# Patient Record
Sex: FEMALE | Race: WHITE | NOT HISPANIC OR LATINO | Employment: OTHER | URBAN - METROPOLITAN AREA
[De-identification: names, ages, dates, MRNs, and addresses within clinical notes are randomized per-mention and may not be internally consistent; named-entity substitution may affect disease eponyms.]

---

## 2019-08-02 ENCOUNTER — HOSPITAL ENCOUNTER (INPATIENT)
Facility: HOSPITAL | Age: 69
LOS: 3 days | Discharge: HOME | End: 2019-08-06
Attending: INTERNAL MEDICINE | Admitting: INTERNAL MEDICINE

## 2019-08-02 NOTE — NURSING NOTE
"  ACC REVIEW REPORT: [facility]        PATIENT NAME: Sandrine Verma    PATIENT ID: [id number]    BED: [id number]    BED TYPE: TELE    BED GIVEN TO: FABIOLA URIARTE RN     TIME BED GIVEN: 1815    YOB: 1950    AGE: 69 YRS    GENDER: FEMALE    PREVIOUS ADMIT TO [facility]:     PREVIOUS ADMISSION DATE:     PATIENT CLASS:     TODAY'S DATE: 8/2/2019    TRANSFER DATE: 08/02/2019    ETA:     TRANSFERRING FACILITY: [facility]    TRANSFERRING FACILITY PHONE # : [phone]    TRANSFERRING MD: DR WILLIS    DATE/TIME REQUEST RECEIVED:  08/02/2019    [facility] RN: WOLFGANG BECKHAM    REPORT FROM: FABIOLA URIARTE RN    TIME REPORT TAKEN: 1815    DIAGNOSIS: SYNCOPAL EPISODE, HYPOXIA, BRADYCARDIA    REASON FOR TRANSFER TO [facility]: HIGHER LEVEL CARE    TRANSPORTATION: GROUND    CLINICAL REASON FOR TRANSFER TO [facility]: FAMILY SAID THAT PATIENT WAS UNRESPONSIVE THIS MORNING. ON ARRIVAL TO THE ER, PATIENT WAS 90% ON RMA THEN IT DROPPED TO 84%.  HER PC02 WAS 60 AND HER P02 WAS 44, 02SAT-73%.  SHE WAS VOMITING \"ON AND OFF\" AND RECEIVED ZOFRAN 4 MG X 2 DOSES.  THEY PUT HER ON THE BIPAP.  AT THAT POINT, HER PC02 WAS 46, P02-67 AND 02 WAS 92%.  AT 1700, CQ56-PHA 57, P02-79.  CT SHOWED NO PE, CARDIAC ENZYMES WERE NEGATIVE.  HER HEART RATE DROPPED TO 39 AT 1430 BUT CURRENTLY IT'S 58.    HISTORY OF A CABG, DIABETES, STENTS, HTN AND KIDNEY STONES        CLINICAL INFORMATION    HEIGHT:     WEIGHT:     ALLERGIES: LEVAQUIN     MOBLEY: NO, URINE WAS NEGATIVE.     INFECTIOUS DISEASE:     ISOLATION:     1ST VITAL SIGNS:   TIME:   TEMP:   PULSE:   B/P:   RESP:     LAST VITAL SIGNS:  TIME: 1800  TEMP: afebrile  PULSE: 58  B/P: 120/64  RESP: 20  98% ON THE BIPAP      BIPAP SETTINGS ARE: 14/6, RATE-16, FI02-25%    LAB INFORMATION: WBC-9.6,     CULTURE INFORMATION:     MEDS/IV FLUIDS: # 20 G LAC                                  # 20 RIGHT HAND     RECEIVED: TYLENOL 650 MG X 1 DOSE                         ZOFRAN 4 MG X 2 DOSES    CARDIAC SYSTEM:    CHEST PAIN: "     RATE: 58    SCALE:     RHYTHM: NSR-SB      Is patient taking or has patient been given any drugs that could increase bleeding?YES  (Plavix, Brilinta, Effient, Eliquis, Xarelto, Warfarin, Integrilin, Angiomax)    DRUG: ASA 81      DOSE/FREQUENCY:     CARDIAC ENZYMES:    DATE: 08/02/2019  TIME:   CK:   CKMB:   ALBAN:   TROP: 0.02    DATE: 08/02/2019  TIME:   CK:   CKMB:   ALBAN:   TROP: 0.02      CARDIAC NOTES: LOWEST RATE WAS 39, BUT NOW HIS RATE IS 58.        RESPIRATORY SYSTEM:    LUNG SOUNDS:    CLEAR: YES  CRACKLES:   WHEEZES:   RHONCHI:   DIMINISHED:     ABG DATE: 08/02/2019        ABG TIME:     ABG RESULTS:    PH: 7.28        PH-7.37  PO2: 44        P02-67  PCO2: 62     PC02-46  HCO3:   O2 SAT:   RMA      OXYGEN:     O2 SAT: 98% ON THE BIPAP    ADMINISTRATION ROUTE:     IMAGING FINDINGS: CT SHOWED NO PE    PNEUMO LOCATION:     PNEUMO SIZE:     PNEUMO CHEST TUBE SEAL TYPE:     RADIOLOGY RESULTS:     RESPIRATORY STATUS:       CNS/MUSCULOSKELETAL    ALERT AND ORIENTED:    PERSON:   YES  PLACE: YES  TIME: yes    MRI RESULTS:     CNS/MUSCULOSKELETAL NOTES:       GI//GY      ABDOMINAL PAIN:     VOMITING: HAS RECEIVED ZOFRAN X 2 DOSES    DIARRHEA:     NAUSEA: YES    BOWEL SOUNDS:     OCCULT STOOL:      VAGINAL BLEEDING:     TESTICULAR PAIN:     HEMATURIA:     NG TUBE:    SIZE:   DATE INSERTED:       ULTRASOUND:     ULTRASOUND RESULTS:       ACUTE ABDOMEN:     ACUTE ABDOMEN RESULTS:       CT SCAN:     CT SCAN RESULTS:       GI//GY NOTES:     PAST MEDICAL HISTORY: CABG & CARDIAC STENTS    OTHER SYMPTOM NOTES:     ADDITIONAL NOTES:           Phoebe Chinchilla RN  8/2/2019  6:05 PM

## 2019-08-03 ENCOUNTER — APPOINTMENT (OUTPATIENT)
Dept: CARDIOLOGY | Facility: HOSPITAL | Age: 69
End: 2019-08-03

## 2019-08-03 ENCOUNTER — APPOINTMENT (OUTPATIENT)
Dept: GENERAL RADIOLOGY | Facility: HOSPITAL | Age: 69
End: 2019-08-03

## 2019-08-03 ENCOUNTER — APPOINTMENT (OUTPATIENT)
Dept: NUCLEAR MEDICINE | Facility: HOSPITAL | Age: 69
End: 2019-08-03

## 2019-08-03 PROBLEM — I45.2 BIFASCICULAR BUNDLE BRANCH BLOCK: Status: ACTIVE | Noted: 2019-08-03

## 2019-08-03 PROBLEM — I25.119 CORONARY ARTERY DISEASE INVOLVING NATIVE CORONARY ARTERY OF NATIVE HEART WITH ANGINA PECTORIS (HCC): Status: ACTIVE | Noted: 2019-08-03

## 2019-08-03 PROBLEM — E78.5 HLD (HYPERLIPIDEMIA): Status: ACTIVE | Noted: 2019-08-03

## 2019-08-03 PROBLEM — R00.1 BRADYCARDIA: Status: ACTIVE | Noted: 2019-08-03

## 2019-08-03 PROBLEM — I10 HTN (HYPERTENSION): Status: ACTIVE | Noted: 2019-08-03

## 2019-08-03 PROBLEM — N28.9 RENAL INSUFFICIENCY: Status: ACTIVE | Noted: 2019-08-03

## 2019-08-03 PROBLEM — E11.9 TYPE 2 DIABETES MELLITUS: Status: ACTIVE | Noted: 2019-08-03

## 2019-08-03 PROBLEM — E03.9 HYPOTHYROIDISM: Status: ACTIVE | Noted: 2019-08-03

## 2019-08-03 PROBLEM — E11.29 TYPE 2 DIABETES MELLITUS WITH KIDNEY COMPLICATION, WITH LONG-TERM CURRENT USE OF INSULIN (HCC): Status: ACTIVE | Noted: 2019-08-03

## 2019-08-03 PROBLEM — I25.10 CAD (CORONARY ARTERY DISEASE): Status: ACTIVE | Noted: 2019-08-03

## 2019-08-03 PROBLEM — R55 SYNCOPAL EPISODES: Status: ACTIVE | Noted: 2019-08-03

## 2019-08-03 PROBLEM — R82.71 BACTERIURIA: Status: ACTIVE | Noted: 2019-08-03

## 2019-08-03 PROBLEM — E87.5 HYPERKALEMIA: Status: ACTIVE | Noted: 2019-08-03

## 2019-08-03 PROBLEM — J96.02 ACUTE RESPIRATORY FAILURE WITH HYPOXIA AND HYPERCAPNIA: Status: ACTIVE | Noted: 2019-08-03

## 2019-08-03 PROBLEM — J96.01 ACUTE RESPIRATORY FAILURE WITH HYPOXIA AND HYPERCAPNIA (HCC): Status: ACTIVE | Noted: 2019-08-03

## 2019-08-03 PROBLEM — N17.9 AKI (ACUTE KIDNEY INJURY) (HCC): Status: ACTIVE | Noted: 2019-08-03

## 2019-08-03 PROBLEM — Z79.4 TYPE 2 DIABETES MELLITUS WITH KIDNEY COMPLICATION, WITH LONG-TERM CURRENT USE OF INSULIN (HCC): Status: ACTIVE | Noted: 2019-08-03

## 2019-08-03 PROBLEM — R00.1 BRADYCARDIA: Status: RESOLVED | Noted: 2019-08-03 | Resolved: 2019-08-03

## 2019-08-03 PROBLEM — E87.1 HYPONATREMIA: Status: ACTIVE | Noted: 2019-08-03

## 2019-08-03 LAB
ALBUMIN SERPL-MCNC: 3.8 G/DL (ref 3.5–5.2)
ALBUMIN/GLOB SERPL: 1.1 G/DL
ALP SERPL-CCNC: 52 U/L (ref 39–117)
ALT SERPL W P-5'-P-CCNC: 13 U/L (ref 1–33)
ANION GAP SERPL CALCULATED.3IONS-SCNC: 13 MMOL/L (ref 5–15)
ANION GAP SERPL CALCULATED.3IONS-SCNC: 14 MMOL/L (ref 5–15)
ARTERIAL PATENCY WRIST A: ABNORMAL
ARTERIAL PATENCY WRIST A: ABNORMAL
AST SERPL-CCNC: 17 U/L (ref 1–32)
ATMOSPHERIC PRESS: ABNORMAL MMHG
BACTERIA UR QL AUTO: ABNORMAL /HPF
BASE EXCESS BLDA CALC-SCNC: -1.3 MMOL/L (ref 0–2)
BASE EXCESS BLDA CALC-SCNC: -2.4 MMOL/L (ref 0–2)
BASE EXCESS BLDV CALC-SCNC: -0.8 MMOL/L (ref -2–2)
BASOPHILS # BLD AUTO: 0.08 10*3/MM3 (ref 0–0.2)
BASOPHILS NFR BLD AUTO: 0.7 % (ref 0–1.5)
BDY SITE: ABNORMAL
BILIRUB SERPL-MCNC: 0.4 MG/DL (ref 0.2–1.2)
BILIRUB UR QL STRIP: NEGATIVE
BODY TEMPERATURE: 37 C
BUN BLD-MCNC: 37 MG/DL (ref 8–23)
BUN BLD-MCNC: 38 MG/DL (ref 8–23)
BUN/CREAT SERPL: 21 (ref 7–25)
BUN/CREAT SERPL: 23.5 (ref 7–25)
CALCIUM SPEC-SCNC: 9 MG/DL (ref 8.6–10.5)
CALCIUM SPEC-SCNC: 9.5 MG/DL (ref 8.6–10.5)
CHLORIDE SERPL-SCNC: 100 MMOL/L (ref 98–107)
CHLORIDE SERPL-SCNC: 99 MMOL/L (ref 98–107)
CHOLEST SERPL-MCNC: 151 MG/DL (ref 0–200)
CLARITY UR: ABNORMAL
CO2 BLDA-SCNC: 26.9 MMOL/L (ref 23–27)
CO2 BLDA-SCNC: 27.4 MMOL/L (ref 23–27)
CO2 BLDA-SCNC: 27.5 MMOL/L (ref 23–27)
CO2 SERPL-SCNC: 20 MMOL/L (ref 22–29)
CO2 SERPL-SCNC: 24 MMOL/L (ref 22–29)
COHGB MFR BLD: 0.7 % (ref 0–2)
COHGB MFR BLD: 1 % (ref 0–2)
COHGB MFR BLD: 1.1 %
COLOR UR: YELLOW
CREAT BLD-MCNC: 1.62 MG/DL (ref 0.57–1)
CREAT BLD-MCNC: 1.76 MG/DL (ref 0.57–1)
CREAT UR-MCNC: 104.8 MG/DL
D DIMER PPP FEU-MCNC: 0.75 MCGFEU/ML (ref 0–0.56)
D-LACTATE SERPL-SCNC: 1.2 MMOL/L (ref 0.5–2)
DEPRECATED RDW RBC AUTO: 45.2 FL (ref 37–54)
EOSINOPHIL # BLD AUTO: 0.17 10*3/MM3 (ref 0–0.4)
EOSINOPHIL NFR BLD AUTO: 1.6 % (ref 0.3–6.2)
EPAP: 0
EPAP: 0
EPAP: 6
ERYTHROCYTE [DISTWIDTH] IN BLOOD BY AUTOMATED COUNT: 13.1 % (ref 12.3–15.4)
GFR SERPL CREATININE-BSD FRML MDRD: 29 ML/MIN/1.73
GFR SERPL CREATININE-BSD FRML MDRD: 31 ML/MIN/1.73
GLOBULIN UR ELPH-MCNC: 3.4 GM/DL
GLUCOSE BLD-MCNC: 236 MG/DL (ref 65–99)
GLUCOSE BLD-MCNC: 275 MG/DL (ref 65–99)
GLUCOSE BLDC GLUCOMTR-MCNC: 244 MG/DL (ref 70–130)
GLUCOSE BLDC GLUCOMTR-MCNC: 293 MG/DL (ref 70–130)
GLUCOSE BLDC GLUCOMTR-MCNC: 297 MG/DL (ref 70–130)
GLUCOSE BLDC GLUCOMTR-MCNC: 301 MG/DL (ref 70–130)
GLUCOSE UR STRIP-MCNC: ABNORMAL MG/DL
HBA1C MFR BLD: 8.8 % (ref 4.8–5.6)
HCO3 BLDA-SCNC: 25.2 MMOL/L (ref 20–26)
HCO3 BLDA-SCNC: 25.9 MMOL/L (ref 20–26)
HCO3 BLDV-SCNC: 25.9 MMOL/L (ref 22–28)
HCT VFR BLD AUTO: 43.6 % (ref 34–46.6)
HCT VFR BLD CALC: 41.1 %
HCT VFR BLD CALC: 42.2 %
HDLC SERPL-MCNC: 44 MG/DL (ref 40–60)
HGB BLD-MCNC: 13.5 G/DL (ref 12–15.9)
HGB BLDA-MCNC: 13.4 G/DL (ref 14–18)
HGB BLDA-MCNC: 13.5 G/DL (ref 14–18)
HGB BLDA-MCNC: 13.8 G/DL (ref 14–18)
HGB UR QL STRIP.AUTO: ABNORMAL
HOROWITZ INDEX BLD+IHG-RTO: 30 %
HOROWITZ INDEX BLD+IHG-RTO: 41 %
HOROWITZ INDEX BLD+IHG-RTO: 45 %
HYALINE CASTS UR QL AUTO: ABNORMAL /LPF
IMM GRANULOCYTES # BLD AUTO: 0.03 10*3/MM3 (ref 0–0.05)
IMM GRANULOCYTES NFR BLD AUTO: 0.3 % (ref 0–0.5)
IPAP: 0
IPAP: 0
IPAP: 14
KETONES UR QL STRIP: NEGATIVE
LDLC SERPL CALC-MCNC: 57 MG/DL (ref 0–100)
LDLC/HDLC SERPL: 1.3 {RATIO}
LEUKOCYTE ESTERASE UR QL STRIP.AUTO: ABNORMAL
LYMPHOCYTES # BLD AUTO: 1.93 10*3/MM3 (ref 0.7–3.1)
LYMPHOCYTES NFR BLD AUTO: 18.1 % (ref 19.6–45.3)
MCH RBC QN AUTO: 29.5 PG (ref 26.6–33)
MCHC RBC AUTO-ENTMCNC: 31 G/DL (ref 31.5–35.7)
MCV RBC AUTO: 95.2 FL (ref 79–97)
METHGB BLD QL: 0.8 %
METHGB BLD QL: 0.9 % (ref 0–1.5)
METHGB BLD QL: 1.1 % (ref 0–1.5)
MODALITY: ABNORMAL
MONOCYTES # BLD AUTO: 0.77 10*3/MM3 (ref 0.1–0.9)
MONOCYTES NFR BLD AUTO: 7.2 % (ref 5–12)
NEUTROPHILS # BLD AUTO: 7.71 10*3/MM3 (ref 1.7–7)
NEUTROPHILS NFR BLD AUTO: 72.1 % (ref 42.7–76)
NITRITE UR QL STRIP: NEGATIVE
NOTE: ABNORMAL
NRBC BLD AUTO-RTO: 0 /100 WBC (ref 0–0.2)
NT-PROBNP SERPL-MCNC: 512.4 PG/ML (ref 5–900)
OXYHGB MFR BLDV: 92.1 % (ref 94–99)
OXYHGB MFR BLDV: 92.8 %
OXYHGB MFR BLDV: 96.4 % (ref 94–99)
PAW @ PEAK INSP FLOW SETTING VENT: 0 CMH2O
PCO2 BLDA: 52.7 MM HG (ref 35–45)
PCO2 BLDA: 54.1 MM HG (ref 35–45)
PCO2 BLDV: 49.5 MM HG (ref 41–51)
PCO2 TEMP ADJ BLD: 52.7 MM HG (ref 35–45)
PCO2 TEMP ADJ BLD: 54.1 MM HG (ref 35–45)
PH BLDA: 7.28 PH UNITS (ref 7.35–7.45)
PH BLDA: 7.3 PH UNITS (ref 7.35–7.45)
PH BLDV: 7.33 PH UNITS
PH UR STRIP.AUTO: <=5 [PH] (ref 5–8)
PH, TEMP CORRECTED: 7.28 PH UNITS
PH, TEMP CORRECTED: 7.3 PH UNITS
PLATELET # BLD AUTO: 241 10*3/MM3 (ref 140–450)
PMV BLD AUTO: 9.2 FL (ref 6–12)
PO2 BLDA: 117 MM HG (ref 83–108)
PO2 BLDA: 71.4 MM HG (ref 83–108)
PO2 BLDV: 71 MM HG (ref 27–53)
PO2 TEMP ADJ BLD: 117 MM HG (ref 83–108)
PO2 TEMP ADJ BLD: 71.4 MM HG (ref 83–108)
POTASSIUM BLD-SCNC: 5.2 MMOL/L (ref 3.5–5.2)
POTASSIUM BLD-SCNC: 5.4 MMOL/L (ref 3.5–5.2)
PROT SERPL-MCNC: 7.2 G/DL (ref 6–8.5)
PROT UR QL STRIP: ABNORMAL
RBC # BLD AUTO: 4.58 10*6/MM3 (ref 3.77–5.28)
RBC # UR: ABNORMAL /HPF
REF LAB TEST METHOD: ABNORMAL
SET MECH RESP RATE: 14
SODIUM BLD-SCNC: 133 MMOL/L (ref 136–145)
SODIUM BLD-SCNC: 137 MMOL/L (ref 136–145)
SODIUM UR-SCNC: <20 MMOL/L
SP GR UR STRIP: 1.04 (ref 1–1.03)
SQUAMOUS #/AREA URNS HPF: ABNORMAL /HPF
TOTAL RATE: 0 BREATHS/MINUTE
TRIGL SERPL-MCNC: 250 MG/DL (ref 0–150)
TROPONIN T SERPL-MCNC: <0.01 NG/ML (ref 0–0.03)
TSH SERPL DL<=0.05 MIU/L-ACNC: 2.44 MIU/ML (ref 0.27–4.2)
UROBILINOGEN UR QL STRIP: ABNORMAL
VENTILATOR MODE: ABNORMAL
VLDLC SERPL-MCNC: 50 MG/DL
WBC NRBC COR # BLD: 10.69 10*3/MM3 (ref 3.4–10.8)
WBC UR QL AUTO: ABNORMAL /HPF

## 2019-08-03 PROCEDURE — 80053 COMPREHEN METABOLIC PANEL: CPT | Performed by: PHYSICIAN ASSISTANT

## 2019-08-03 PROCEDURE — 99223 1ST HOSP IP/OBS HIGH 75: CPT | Performed by: FAMILY MEDICINE

## 2019-08-03 PROCEDURE — 94660 CPAP INITIATION&MGMT: CPT

## 2019-08-03 PROCEDURE — 87086 URINE CULTURE/COLONY COUNT: CPT | Performed by: PHYSICIAN ASSISTANT

## 2019-08-03 PROCEDURE — 82805 BLOOD GASES W/O2 SATURATION: CPT

## 2019-08-03 PROCEDURE — 93880 EXTRACRANIAL BILAT STUDY: CPT | Performed by: INTERNAL MEDICINE

## 2019-08-03 PROCEDURE — 36600 WITHDRAWAL OF ARTERIAL BLOOD: CPT

## 2019-08-03 PROCEDURE — 99222 1ST HOSP IP/OBS MODERATE 55: CPT | Performed by: INTERNAL MEDICINE

## 2019-08-03 PROCEDURE — 25010000002 ENOXAPARIN PER 10 MG: Performed by: INTERNAL MEDICINE

## 2019-08-03 PROCEDURE — 63710000001 FLUTICASONE 50 MCG/ACT SUSPENSION 16 G BOTTLE: Performed by: PHYSICIAN ASSISTANT

## 2019-08-03 PROCEDURE — 82570 ASSAY OF URINE CREATININE: CPT | Performed by: INTERNAL MEDICINE

## 2019-08-03 PROCEDURE — 25010000002 CEFTRIAXONE PER 250 MG: Performed by: INTERNAL MEDICINE

## 2019-08-03 PROCEDURE — 93880 EXTRACRANIAL BILAT STUDY: CPT

## 2019-08-03 PROCEDURE — 63710000001 ASPIRIN 325 MG TABLET: Performed by: PHYSICIAN ASSISTANT

## 2019-08-03 PROCEDURE — 93010 ELECTROCARDIOGRAM REPORT: CPT | Performed by: INTERNAL MEDICINE

## 2019-08-03 PROCEDURE — A9270 NON-COVERED ITEM OR SERVICE: HCPCS | Performed by: PHYSICIAN ASSISTANT

## 2019-08-03 PROCEDURE — 85025 COMPLETE CBC W/AUTO DIFF WBC: CPT | Performed by: PHYSICIAN ASSISTANT

## 2019-08-03 PROCEDURE — A9558 XE133 XENON 10MCI: HCPCS | Performed by: INTERNAL MEDICINE

## 2019-08-03 PROCEDURE — 63710000001 INSULIN LISPRO (HUMAN) PER 5 UNITS: Performed by: PHYSICIAN ASSISTANT

## 2019-08-03 PROCEDURE — 25010000002 HEPARIN (PORCINE) PER 1000 UNITS: Performed by: PHYSICIAN ASSISTANT

## 2019-08-03 PROCEDURE — 82962 GLUCOSE BLOOD TEST: CPT

## 2019-08-03 PROCEDURE — 83880 ASSAY OF NATRIURETIC PEPTIDE: CPT | Performed by: PHYSICIAN ASSISTANT

## 2019-08-03 PROCEDURE — 82820 HEMOGLOBIN-OXYGEN AFFINITY: CPT

## 2019-08-03 PROCEDURE — 83605 ASSAY OF LACTIC ACID: CPT | Performed by: PHYSICIAN ASSISTANT

## 2019-08-03 PROCEDURE — 63710000001 LEVOTHYROXINE 100 MCG TABLET: Performed by: PHYSICIAN ASSISTANT

## 2019-08-03 PROCEDURE — 0 TECHNETIUM ALBUMIN AGGREGATED: Performed by: INTERNAL MEDICINE

## 2019-08-03 PROCEDURE — A9540 TC99M MAA: HCPCS | Performed by: INTERNAL MEDICINE

## 2019-08-03 PROCEDURE — 84300 ASSAY OF URINE SODIUM: CPT | Performed by: INTERNAL MEDICINE

## 2019-08-03 PROCEDURE — 81001 URINALYSIS AUTO W/SCOPE: CPT | Performed by: PHYSICIAN ASSISTANT

## 2019-08-03 PROCEDURE — 94799 UNLISTED PULMONARY SVC/PX: CPT

## 2019-08-03 PROCEDURE — 93005 ELECTROCARDIOGRAM TRACING: CPT | Performed by: PHYSICIAN ASSISTANT

## 2019-08-03 PROCEDURE — 71045 X-RAY EXAM CHEST 1 VIEW: CPT

## 2019-08-03 PROCEDURE — 85379 FIBRIN DEGRADATION QUANT: CPT | Performed by: PHYSICIAN ASSISTANT

## 2019-08-03 PROCEDURE — 80061 LIPID PANEL: CPT | Performed by: PHYSICIAN ASSISTANT

## 2019-08-03 PROCEDURE — 83036 HEMOGLOBIN GLYCOSYLATED A1C: CPT | Performed by: PHYSICIAN ASSISTANT

## 2019-08-03 PROCEDURE — 0 XENON XE 133: Performed by: INTERNAL MEDICINE

## 2019-08-03 PROCEDURE — 84443 ASSAY THYROID STIM HORMONE: CPT | Performed by: PHYSICIAN ASSISTANT

## 2019-08-03 PROCEDURE — 78582 LUNG VENTILAT&PERFUS IMAGING: CPT

## 2019-08-03 PROCEDURE — 93306 TTE W/DOPPLER COMPLETE: CPT

## 2019-08-03 PROCEDURE — 93306 TTE W/DOPPLER COMPLETE: CPT | Performed by: INTERNAL MEDICINE

## 2019-08-03 PROCEDURE — 84484 ASSAY OF TROPONIN QUANT: CPT | Performed by: PHYSICIAN ASSISTANT

## 2019-08-03 PROCEDURE — 63710000001 CITALOPRAM 20 MG TABLET: Performed by: PHYSICIAN ASSISTANT

## 2019-08-03 PROCEDURE — 99291 CRITICAL CARE FIRST HOUR: CPT | Performed by: INTERNAL MEDICINE

## 2019-08-03 RX ORDER — SODIUM CHLORIDE 9 MG/ML
100 INJECTION, SOLUTION INTRAVENOUS CONTINUOUS
Status: DISCONTINUED | OUTPATIENT
Start: 2019-08-03 | End: 2019-08-04

## 2019-08-03 RX ORDER — LEVOTHYROXINE SODIUM 0.1 MG/1
100 TABLET ORAL EVERY MORNING
COMMUNITY

## 2019-08-03 RX ORDER — ASPIRIN 325 MG
325 TABLET ORAL DAILY
Status: DISCONTINUED | OUTPATIENT
Start: 2019-08-03 | End: 2019-08-06 | Stop reason: HOSPADM

## 2019-08-03 RX ORDER — QUINAPRIL 5 1/1
2.5 TABLET ORAL DAILY
COMMUNITY
End: 2019-08-06 | Stop reason: HOSPADM

## 2019-08-03 RX ORDER — DEXTROSE MONOHYDRATE 25 G/50ML
25 INJECTION, SOLUTION INTRAVENOUS
Status: DISCONTINUED | OUTPATIENT
Start: 2019-08-03 | End: 2019-08-06 | Stop reason: HOSPADM

## 2019-08-03 RX ORDER — FLUTICASONE PROPIONATE 50 MCG
2 SPRAY, SUSPENSION (ML) NASAL DAILY
COMMUNITY

## 2019-08-03 RX ORDER — SODIUM CHLORIDE 0.9 % (FLUSH) 0.9 %
3-10 SYRINGE (ML) INJECTION AS NEEDED
Status: DISCONTINUED | OUTPATIENT
Start: 2019-08-03 | End: 2019-08-06 | Stop reason: HOSPADM

## 2019-08-03 RX ORDER — ATORVASTATIN CALCIUM 20 MG/1
20 TABLET, FILM COATED ORAL NIGHTLY
Status: DISCONTINUED | OUTPATIENT
Start: 2019-08-03 | End: 2019-08-06 | Stop reason: HOSPADM

## 2019-08-03 RX ORDER — CHOLESTYRAMINE LIGHT 4 G/5.7G
1 POWDER, FOR SUSPENSION ORAL 2 TIMES DAILY WITH MEALS
Status: DISCONTINUED | OUTPATIENT
Start: 2019-08-03 | End: 2019-08-06 | Stop reason: HOSPADM

## 2019-08-03 RX ORDER — ONDANSETRON 2 MG/ML
4 INJECTION INTRAMUSCULAR; INTRAVENOUS EVERY 6 HOURS PRN
Status: DISCONTINUED | OUTPATIENT
Start: 2019-08-03 | End: 2019-08-06 | Stop reason: HOSPADM

## 2019-08-03 RX ORDER — LEVOTHYROXINE SODIUM 0.1 MG/1
100 TABLET ORAL
Status: DISCONTINUED | OUTPATIENT
Start: 2019-08-03 | End: 2019-08-06 | Stop reason: HOSPADM

## 2019-08-03 RX ORDER — NICOTINE POLACRILEX 4 MG
15 LOZENGE BUCCAL
Status: DISCONTINUED | OUTPATIENT
Start: 2019-08-03 | End: 2019-08-06 | Stop reason: HOSPADM

## 2019-08-03 RX ORDER — ATORVASTATIN CALCIUM 20 MG/1
20 TABLET, FILM COATED ORAL NIGHTLY
COMMUNITY

## 2019-08-03 RX ORDER — CITALOPRAM 20 MG/1
20 TABLET ORAL DAILY
Status: DISCONTINUED | OUTPATIENT
Start: 2019-08-03 | End: 2019-08-06 | Stop reason: HOSPADM

## 2019-08-03 RX ORDER — SODIUM CHLORIDE 0.9 % (FLUSH) 0.9 %
3 SYRINGE (ML) INJECTION EVERY 12 HOURS SCHEDULED
Status: DISCONTINUED | OUTPATIENT
Start: 2019-08-03 | End: 2019-08-06 | Stop reason: HOSPADM

## 2019-08-03 RX ORDER — ONDANSETRON 4 MG/1
4 TABLET, FILM COATED ORAL EVERY 6 HOURS PRN
Status: DISCONTINUED | OUTPATIENT
Start: 2019-08-03 | End: 2019-08-06 | Stop reason: HOSPADM

## 2019-08-03 RX ORDER — CITALOPRAM 20 MG/1
20 TABLET ORAL DAILY
COMMUNITY

## 2019-08-03 RX ORDER — SODIUM CHLORIDE 9 MG/ML
100 INJECTION, SOLUTION INTRAVENOUS ONCE
Status: COMPLETED | OUTPATIENT
Start: 2019-08-03 | End: 2019-08-03

## 2019-08-03 RX ORDER — FLUTICASONE PROPIONATE 50 MCG
2 SPRAY, SUSPENSION (ML) NASAL DAILY
Status: DISCONTINUED | OUTPATIENT
Start: 2019-08-03 | End: 2019-08-06 | Stop reason: HOSPADM

## 2019-08-03 RX ORDER — HEPARIN SODIUM 5000 [USP'U]/ML
5000 INJECTION, SOLUTION INTRAVENOUS; SUBCUTANEOUS EVERY 8 HOURS SCHEDULED
Status: DISCONTINUED | OUTPATIENT
Start: 2019-08-03 | End: 2019-08-03

## 2019-08-03 RX ORDER — COLESEVELAM 180 1/1
1250 TABLET ORAL 2 TIMES DAILY WITH MEALS
COMMUNITY

## 2019-08-03 RX ORDER — LISINOPRIL 5 MG/1
2.5 TABLET ORAL
Status: DISCONTINUED | OUTPATIENT
Start: 2019-08-03 | End: 2019-08-03

## 2019-08-03 RX ADMIN — CHOLESTYRAMINE 4 G: 4 POWDER, FOR SUSPENSION ORAL at 15:01

## 2019-08-03 RX ADMIN — SODIUM CHLORIDE 100 ML/HR: 9 INJECTION, SOLUTION INTRAVENOUS at 03:25

## 2019-08-03 RX ADMIN — Medication 1 DOSE: at 16:08

## 2019-08-03 RX ADMIN — CITALOPRAM HYDROBROMIDE 20 MG: 20 TABLET ORAL at 08:21

## 2019-08-03 RX ADMIN — XENON XE-133 13.77 MILLICURIE: 10 GAS RESPIRATORY (INHALATION) at 15:58

## 2019-08-03 RX ADMIN — INSULIN LISPRO 4 UNITS: 100 INJECTION, SOLUTION INTRAVENOUS; SUBCUTANEOUS at 12:03

## 2019-08-03 RX ADMIN — ENOXAPARIN SODIUM 90 MG: 100 INJECTION SUBCUTANEOUS at 20:39

## 2019-08-03 RX ADMIN — LEVOTHYROXINE SODIUM 100 MCG: 100 TABLET ORAL at 06:40

## 2019-08-03 RX ADMIN — HEPARIN SODIUM 5000 UNITS: 5000 INJECTION INTRAVENOUS; SUBCUTANEOUS at 06:40

## 2019-08-03 RX ADMIN — ASPIRIN 325 MG ORAL TABLET 325 MG: 325 PILL ORAL at 08:21

## 2019-08-03 RX ADMIN — INSULIN LISPRO 3 UNITS: 100 INJECTION, SOLUTION INTRAVENOUS; SUBCUTANEOUS at 18:06

## 2019-08-03 RX ADMIN — FLUTICASONE PROPIONATE 2 SPRAY: 50 SPRAY, METERED NASAL at 08:22

## 2019-08-03 RX ADMIN — ENOXAPARIN SODIUM 90 MG: 100 INJECTION SUBCUTANEOUS at 08:21

## 2019-08-03 RX ADMIN — INSULIN LISPRO 4 UNITS: 100 INJECTION, SOLUTION INTRAVENOUS; SUBCUTANEOUS at 08:22

## 2019-08-03 RX ADMIN — PATIROMER 1 PACKET: 8.4 POWDER, FOR SUSPENSION ORAL at 10:21

## 2019-08-03 RX ADMIN — ATORVASTATIN CALCIUM 20 MG: 20 TABLET, FILM COATED ORAL at 20:40

## 2019-08-03 RX ADMIN — CEFTRIAXONE SODIUM 1 G: 1 INJECTION, POWDER, FOR SOLUTION INTRAMUSCULAR; INTRAVENOUS at 08:19

## 2019-08-03 RX ADMIN — CHOLECALCIFEROL CAP 125 MCG (5000 UNIT) 5000 UNITS: 125 CAP at 09:14

## 2019-08-03 RX ADMIN — INSULIN LISPRO 5 UNITS: 100 INJECTION, SOLUTION INTRAVENOUS; SUBCUTANEOUS at 20:40

## 2019-08-03 RX ADMIN — SODIUM CHLORIDE 100 ML/HR: 9 INJECTION, SOLUTION INTRAVENOUS at 14:58

## 2019-08-03 RX ADMIN — SODIUM CHLORIDE, PRESERVATIVE FREE 10 ML: 5 INJECTION INTRAVENOUS at 08:24

## 2019-08-03 NOTE — CONSULTS
Inpatient Cardiology Consult  Consult performed by: Elliot Ramos IV, MD  Consult ordered by: Vanna Pedersen PA-C  Reason for consult: Syncope / MUHAMMAD / history of CAD          [facility]  Cardiovascular Consultation Note         The patient is a 69-year-old female with a history of coronary artery disease status post CABG in 2007 and PCI in 2009 at [facility], type 2 diabetes, chronic renal insufficiency.  The patient was transferred from [facility] to [facility] after experiencing a syncopal episode yesterday morning.  The patient and her  had been out walking for exercise earlier that morning.  They had returned to the house, taken coffee, and were about to take a shower.  The patient was sitting on a couch and her  noticed that she was not responding to his questions.  The patient had lost consciousness and was slumped over on the couch.  He reports that she looked ashen.  EMS was called and arrived within 10 minutes.  The patient became nauseous and vomited in the EMS truck.    At [facility] ER, initial EKG showed marked sinus bradycardia with a heart rate of 39 bpm and a bifascicular block.  She was hypoxic.  Troponins and BNP were unremarkable.  She was transferred to [facility] for cardiac evaluation.    The patient reports being sedentary but over the last couple weeks has attempted to start a walking regimen with her .  She states that she becomes winded after 15 minutes and a quarter of a mile.  She reports that when she had her stent placed after bypass surgery, she was experiencing dyspnea and chest pressure.  The patient denies any recent chest pressure but certainly endorses dyspnea.    A VQ scan has just been completed and an echocardiogram is pending.      Cardiac risk factors: Advanced age, known CAD, hypertension, hyperlipidemia, uncontrolled diabetes mellitus    Past medical and surgical  history, social and family history reviewed in EMR.    REVIEW OF SYSTEMS:   H&P ROS reviewed and pertinent CV ROS as noted in HPI.         Vital Sign Min/Max for last 24 hours  Temp  Min: 98 °F (36.7 °C)  Max: 98.4 °F (36.9 °C)   BP  Min: 124/59  Max: 140/62   Pulse  Min: 61  Max: 76   Resp  Min: 16  Max: 18   SpO2  Min: 84 %  Max: 100 %   No Data Recorded      Intake/Output Summary (Last 24 hours) at 8/3/2019 1658  Last data filed at 8/3/2019 0819  Gross per 24 hour   Intake 100 ml   Output 600 ml   Net -500 ml           Physical Exam   Constitutional: She is oriented to person, place, and time. She appears well-developed and well-nourished.   HENT:   Head: Normocephalic and atraumatic.   Eyes: Pupils are equal, round, and reactive to light. No scleral icterus.   Neck: No JVD present. Carotid bruit is not present. No thyromegaly present.   Cardiovascular: Normal rate and regular rhythm. Exam reveals no gallop.   No murmur heard.  Pulmonary/Chest: Effort normal and breath sounds normal.   Abdominal: Soft. She exhibits no distension. There is no hepatosplenomegaly.   Musculoskeletal: She exhibits no edema.   Neurological: She is alert and oriented to person, place, and time.   Skin: Skin is warm and dry.   Psychiatric: She has a normal mood and affect. Her behavior is normal.       EKG (8/3/2019):  NSR at 71.  First-degree AV block.  Right bundle branch block.  Bifascicular block     EKG performed at OSH shows marked sinus bradycardia at 39 bpm.  Bifascicular block      Lab Review:   Labs reviewed in the electronic medical record.  Pertinent findings include:  Lab Results   Component Value Date    GLUCOSE 275 (H) 08/03/2019    BUN 37 (H) 08/03/2019    CREATININE 1.76 (H) 08/03/2019    EGFRIFNONA 29 (L) 08/03/2019    BCR 21.0 08/03/2019    K 5.2 08/03/2019    CO2 20.0 (L) 08/03/2019    CALCIUM 9.0 08/03/2019    ALBUMIN 3.80 08/03/2019    AST 17 08/03/2019    ALT 13 08/03/2019     Lab Results   Component Value Date     WBC 10.69 08/03/2019    HGB 13.5 08/03/2019    HCT 43.6 08/03/2019    MCV 95.2 08/03/2019     08/03/2019     Lab Results   Component Value Date    CHOL 151 08/03/2019    TRIG 250 (H) 08/03/2019    HDL 44 08/03/2019    LDL 57 08/03/2019     Results from last 7 days   Lab Units 08/03/19  0116   HEMOGLOBIN A1C % 8.80*       CHEST X-RAY, 8/3/2019    1. No active disease.  2. Mild cardiomegaly. CABG.     Echo pending    VQ scan pending           Active Hospital Problems    Diagnosis   • **Syncope     · Nonexertional syncope, 8/2/2019  · EKG (8/2/2019): marked sinus bradycardia 39 bpm and bifascicular block (on metoprolol 12.5 mg bid)     • Acute respiratory failure with hypoxia and hypercapnia (CMS/HCC)   • Coronary artery disease involving native coronary artery of native heart with angina pectoris (CMS/HCC)     · CABG at [place] 2007  · Multiple PCI's last 2009 with Dr. Woodward  · Functional class III heart failure symptoms, 8/2019     • Type 2 diabetes mellitus, uncontrolled   • Renal insufficiency     · Unknown baseline Cr     • Bifascicular bundle branch block   • Bacteriuria   • Essential hypertension   • Hyperlipidemia LDL goal <70     · High intensity statin therapy indicated     • Hypothyroidism   • Hyperkalemia   • Hyponatremia             · No beta-blocker due to symptomatic bradycardia and bifascicular block  · Await results of V/Q and echo  · Ischemic evaluation Monday.  Will determine modality following echo results and renal function results tomorrow.  · Will review with EP.  If no other cause of syncope found, patient may require permanent pacemaker.      ADEBAYO Ramos MD [facility], [facility]  Interventional and General Cardiology

## 2019-08-03 NOTE — PLAN OF CARE
Problem: Patient Care Overview  Goal: Plan of Care Review  Outcome: Ongoing (interventions implemented as appropriate)   08/03/19 6458   Coping/Psychosocial   Plan of Care Reviewed With patient;family   Plan of Care Review   Progress improving   OTHER   Outcome Summary tolerating 5L NC, vss, SR on tele. denies pain. lg BM , urine collected and patient voiding spontaneously.

## 2019-08-03 NOTE — PLAN OF CARE
Problem: Patient Care Overview  Goal: Plan of Care Review  Outcome: Ongoing (interventions implemented as appropriate)   08/03/19 0623   Coping/Psychosocial   Plan of Care Reviewed With patient   Plan of Care Review   Progress no change   OTHER   Outcome Summary Pt arrived from [facility]. BipPAP at 45%. NSR. No reports of pain. Jenni celeste.

## 2019-08-03 NOTE — H&P
[facility] Medicine Services  HISTORY AND PHYSICAL    Patient Name: Sandrine Verma  : 1950  MRN: [id number]  Primary Care Physician: Chelsey Bhagat MD  Date of admission: 2019      Subjective   Subjective     Chief Complaint: Syncopal episode    HPI:  Sandrine Verma is a 69 y.o. female with a medical history significant for CAD, HTN, T2DM presented to OSH after syncopal episode. The pt went for a 15 min walk this morning and afterwards while eating breakfast she abruptly stopped responding to her .  The  found her slumped over and unresponsive.  The patient's daughter was there several minutes later and stated she was breathing however she could not form words, she was diaphoretic, gray and began vomiting several times. She admits to syncopal episodes in the past however attributes them to low blood sugar.  Patient states her mother is chronically hypoxic however has never seen a pulmonologist.  During examination, Ms. Verma reports she hasn't been feeling well for several weeks and states she has felt more tired than usual. The pt's daughter reports the pt has been sleeping most hours of the day for the last several months. The pt admits to some urgency and urine odor for 1 week. No hematuria. The daughter reports the pt has had several episodes of diarrhea with fecal incontinence. No fever, chills, SOB, or CP. No facial droop, slurred speech or unilateral weakness.  No personal history of stroke or  Arrhythmia. No hx of smoking. The pt lives with her .     At the outside hospital, the patient was hypoxic at 84% on RA, as well as bradycardic with an isolated heart rate of 39.  Labs revealed glucose 341, BUN 3.5, troponin.  UA with 3+ bacteria.  EKG sinus rhythm with first degree AV block, right bundle branch block and left anterior fascicular block.  CT chest negative.  First ABG PCO2 60 PO2 44, second ABG on BiPAP PCO2 46 PO2 67.  The patient was  transferred to [facility] for higher level of care.  The patient will be admitted to the hospital service for further medical management.    Review of Systems   Constitutional: Positive for diaphoresis and fatigue. Negative for appetite change, chills and fever.   HENT: Negative for congestion, sore throat and trouble swallowing.    Eyes: Negative for pain and visual disturbance.   Respiratory: Negative for cough, shortness of breath and wheezing.    Cardiovascular: Negative for chest pain, palpitations and leg swelling.   Gastrointestinal: Positive for diarrhea (chronic ), nausea and vomiting. Negative for abdominal pain, blood in stool and constipation.   Genitourinary: Positive for urgency. Negative for difficulty urinating and dysuria.   Musculoskeletal: Negative for back pain and gait problem.   Skin: Negative for rash and wound.   Neurological: Positive for syncope and speech difficulty. Negative for dizziness, tremors, seizures, facial asymmetry, weakness, light-headedness, numbness and headaches.   Hematological: Negative for adenopathy. Does not bruise/bleed easily.   Psychiatric/Behavioral: Positive for confusion. Negative for agitation.      Otherwise complete ROS reviewed and is negative except as mentioned in the HPI.    Personal History     Past Medical History:   Diagnosis Date   • Coronary artery disease    • Diabetes mellitus (CMS/HCC)    • Disease of thyroid gland    • Elevated cholesterol    • Hypertension    • Kidney stone        Past Surgical History:   Procedure Laterality Date   • APPENDECTOMY     • BACK SURGERY     • CHOLECYSTECTOMY     • CORONARY ARTERY BYPASS GRAFT     • HYSTERECTOMY         Family History: family history is not on file. Otherwise pertinent FHx was reviewed and unremarkable.     Social History:    Social History     Social History Narrative   • Not on file       Medications:    Available home medication information reviewed.  Medications Prior to Admission   Medication Sig  Dispense Refill Last Dose   • atorvastatin (LIPITOR) 20 MG tablet Take 20 mg by mouth Every Night.      • Canagliflozin (INVOKANA) 300 MG tablet Take 300 mg by mouth Daily.      • Cholecalciferol (VITAMIN D3) 5000 units capsule capsule Take 5,000 Units by mouth Daily.      • citalopram (CeleXA) 20 MG tablet Take 20 mg by mouth Daily.      • colesevelam (WELCHOL) 625 MG tablet Take 1,250 mg by mouth 2 (Two) Times a Day With Meals.      • fluticasone (FLONASE) 50 MCG/ACT nasal spray 2 sprays into the nostril(s) as directed by provider Daily.      • Insulin Degludec (TRESIBA FLEXTOUCH) 200 UNIT/ML solution pen-injector Inject 90 Units under the skin into the appropriate area as directed Daily.      • Insulin Lispro (HUMALOG KWIKPEN) 100 UNIT/ML solution pen-injector Inject 10 Units under the skin into the appropriate area as directed 3 (Three) Times a Day Before Meals. 10 units before breakfast and lunch, 20 units before dinner      • levothyroxine (SYNTHROID, LEVOTHROID) 100 MCG tablet Take 100 mcg by mouth Every Morning.      • metoprolol tartrate (LOPRESSOR) 25 MG tablet Take 12.5 mg by mouth 2 (Two) Times a Day.      • quinapril (ACCUPRIL) 5 MG tablet Take 2.5 mg by mouth Daily.      • Semaglutide (OZEMPIC) 1 MG/DOSE solution pen-injector Inject 1 mg under the skin into the appropriate area as directed 1 (One) Time Per Week.          Allergies not on file    Objective   Objective     Vital Signs:   Temp:  [98.4 °F (36.9 °C)] 98.4 °F (36.9 °C)  Heart Rate:  [61] 61  Resp:  [18] 18  BP: (124-140)/(59-62) 124/59      Physical Exam   Constitutional: Awake, alert, lying in bed  Eyes: PERRLA, sclerae anicteric, no conjunctival injection  HENT: NCAT, mucous membranes moist  Neck: Supple, no thyromegaly, no lymphadenopathy, trachea midline  Respiratory: Clear to auscultation bilaterally, no wheezes, nonlabored respirations, 100% on BiPAP  Cardiovascular: RRR, no murmurs appreciated, palpable pedal pulses  bilaterally  Gastrointestinal: Positive bowel sounds, protuberant, nontender, no guarding   Musculoskeletal: No bilateral ankle edema, no clubbing or cyanosis to extremities, diffuse swelling of right upper extremity secondary to IV at OSH  Psychiatric: Appropriate affect, cooperative  Neurologic: Oriented x 3, strength and sensation symmetric in all extremities, Cranial Nerves grossly intact to confrontation, speech clear, normal finger-to-nose  Skin: No rashes or wounds    Results Reviewed:  I have personally reviewed current lab, radiology, and data and agree.              Invalid input(s):  ALKPHOS  CrCl cannot be calculated (No order found.).  Brief Urine Lab Results     None        Imaging Results (last 24 hours)     ** No results found for the last 24 hours. **             Assessment/Plan   Assessment / Plan     Active Hospital Problems    Diagnosis POA   • **Acute respiratory failure with hypoxia and hypercapnia (CMS/HCC) [J96.01, J96.02] Yes   • Syncopal episodes [R55] Yes   • Bradycardia [R00.1] Yes   • Bacteriuria [R82.71] Yes   • CAD (coronary artery disease) [I25.10] Yes   • HTN (hypertension) [I10] Yes   • HLD (hyperlipidemia) [E78.5] Yes   • Type 2 diabetes mellitus (CMS/HCC) [E11.9] Yes   • Hypothyroidism [E03.9] Yes     Syncopal episode  Acute respiratory failure with hypoxia & hypercapnia   --Last ABG at OSH PCO2 46 PO2 67 on BiPAP; repeat ABG   --CT chest at OSH negative, disc available in pt's chart  --Consider CTA, pending Ddimer   --Echo and carotid duplex tomorrow   --Consult cardiology   --Gentle fluids overnight   --CBC, CMP, BNP, Trop  --Orthostatics   --Fall precautions & neuro checks     Bradycardia  --HR 39 at OSH, currently 61  --EKG at OSH sinus rhythm with first degree AV block, right bundle branch block and left anterior fascicular block  --Consult cardiology   --Hold BB    --Monitor on telemetry     Bacteriuria   --UA at OSH +3 bacteria  --Check UA and culture     CAD s/p  CABG/stenting, HTN, HLD  --Blood pressure stable   --Hold BB  --Continue statin and ACEI    T2DM  --Hold home medications  --SSI   --A1c in the AM    Hypothyroidism  --Continue synthroid  --Check TSH    DVT prophylaxis:  [facility]    CODE STATUS:    Code Status and Medical Interventions:   Ordered at: 08/03/19 0133     Level Of Support Discussed With:    Patient     Code Status:    CPR     Medical Interventions (Level of Support Prior to Arrest):    Full     Admission Status:  I believe this patient meets INPATIENT status due to the need for care which can only be reasonably provided in an hospital setting including treatment of acute hypoxic, hypercapnic respiratory failure as well as continued work-up for syncope and bradycardia, requiring telemetry monitoring as well as cardiology consultation.  As such, I feel patient’s risk for adverse outcomes and need for care warrant INPATIENT evaluation and predict the patient’s care encounter to likely last beyond 2 midnights.    Electronically signed by Vanna Pedersen PA-C, 08/03/19, 12:14 AM.      Brief Attending Admission Attestation     I have seen and examined the patient, performing an independent face-to-face diagnostic evaluation with plan of care reviewed and developed with the advanced practice clinician (APC).      Brief Summary Statement:   Sandrine Verma is a 69 y.o. female who presented as a transfer from outside hospital secondary to complaints of syncope with findings of acute hypoxic and hypercapnic respiratory failure as well as bradycardia.  She cannot recall precipitating event.  She cannot name any exacerbating or alleviating factors.    Remainder of detailed HPI is as noted above and has been reviewed and/or edited by me for completeness.      Attending Physical Exam:  Constitutional: Awake, alert  Eyes: PERRLA, sclerae anicteric, no conjunctival injection  HENT: NCAT, mucous membranes moist  Neck: Supple, no thyromegaly, no lymphadenopathy, trachea  midline  Respiratory: Decreased breath sounds bilaterally, nonlabored respirations, currently using BiPAP  Cardiovascular: RRR, palpable pedal pulses bilaterally  Gastrointestinal: Positive bowel sounds, soft, nontender, nondistended  Musculoskeletal: No bilateral ankle edema, no clubbing or cyanosis to extremities  Psychiatric: Appropriate affect, cooperative  Neurologic: Oriented x 3, strength symmetric in all extremities, Cranial Nerves grossly intact to confrontation, speech clear  Skin: No rashes     Brief Assessment/Plan :  See above for further detailed assessment and plan developed with APC which I have reviewed and/or edited for completeness.      Electronically signed by Saba Miranda MD, 08/03/19, 1:39 AM.

## 2019-08-03 NOTE — PROGRESS NOTES
Patient admitted early this am by my partner with respiratory failure/syncope/UAMIR. Laboratory evaluation remains markedly abnormal with hypercarbic respiratory failure, hyperkalemia/UMAIR. Patient has remained on bipap overnight. She still feels tired but does answer questions appropriately.     Exam:  Vitals:    08/03/19 0324   BP: 132/62   Pulse: 76   Resp: 16   Temp: 98.4 °F (36.9 °C)   SpO2: 96%     Constitutional: Mild distress, lethargic  HENT: NCAT, mucous membranes moist  Respiratory: Clear to auscultation bilaterally, respiratory effort normal, bipap  Cardiovascular: RRR, no murmurs, rubs, or gallops, palpable pedal pulses bilaterally  Gastrointestinal: Positive bowel sounds, soft, nontender, nondistended  Musculoskeletal: RUE with extensive edema, 2+ radial pulse  Psychiatric: Appropriate affect, cooperative  Neurologic: Oriented x 3, strength symmetric in all extremities, Cranial Nerves grossly intact to confrontation, speech clear  Skin: No rashes    CT chest from OSH without marked abnormalities.    Plan:  --Repeat stat ABG, BMP. If creatinine better proceed with CTA chest otherwise will need stat V/Q scan to r/o PE. Therapeutic lovenox dose once until VTE r/o.  --Unclear baseline creatinine. 1 packet veltassa. Stop lisinopril. BMP pending.  --Continue IV antibiotics for suspected UTI from OSH.  --Awaiting cardiology input for bradycardia and abnl EKG. Bradycardia has resolved w/ holding b blocker.    42 minutes of critical care provided. This time excludes other billable procedures. Time does include preparation of documents, medical consultations, review of old records, and direct bedside care. Patient was at high risk for life-threatening deterioration due to respiratory failure, UMAIR/hyperkalemia.

## 2019-08-04 PROBLEM — E87.1 HYPONATREMIA: Status: RESOLVED | Noted: 2019-08-03 | Resolved: 2019-08-04

## 2019-08-04 PROBLEM — R09.02 HYPOXIA: Status: ACTIVE | Noted: 2019-08-04

## 2019-08-04 PROBLEM — E87.5 HYPERKALEMIA: Status: RESOLVED | Noted: 2019-08-03 | Resolved: 2019-08-04

## 2019-08-04 PROBLEM — R09.02 HYPOXIA: Status: RESOLVED | Noted: 2019-08-04 | Resolved: 2019-08-04

## 2019-08-04 LAB
ANION GAP SERPL CALCULATED.3IONS-SCNC: 10 MMOL/L (ref 5–15)
BH CV ECHO MEAS - AO ROOT AREA (BSA CORRECTED): 1.6
BH CV ECHO MEAS - AO ROOT AREA: 7.3 CM^2
BH CV ECHO MEAS - AO ROOT DIAM: 3 CM
BH CV ECHO MEAS - BSA(HAYCOCK): 2.1 M^2
BH CV ECHO MEAS - BSA: 1.9 M^2
BH CV ECHO MEAS - BZI_BMI: 37.1 KILOGRAMS/M^2
BH CV ECHO MEAS - BZI_METRIC_HEIGHT: 157.5 CM
BH CV ECHO MEAS - BZI_METRIC_WEIGHT: 92.1 KG
BH CV ECHO MEAS - EDV(CUBED): 72.4 ML
BH CV ECHO MEAS - EDV(MOD-SP2): 119 ML
BH CV ECHO MEAS - EDV(MOD-SP4): 128 ML
BH CV ECHO MEAS - EDV(TEICH): 77.1 ML
BH CV ECHO MEAS - EF(CUBED): 52.8 %
BH CV ECHO MEAS - EF(MOD-BP): 65 %
BH CV ECHO MEAS - EF(MOD-SP2): 66.4 %
BH CV ECHO MEAS - EF(MOD-SP4): 68.8 %
BH CV ECHO MEAS - EF(TEICH): 45.1 %
BH CV ECHO MEAS - ESV(CUBED): 34.1 ML
BH CV ECHO MEAS - ESV(MOD-SP2): 40 ML
BH CV ECHO MEAS - ESV(MOD-SP4): 40 ML
BH CV ECHO MEAS - ESV(TEICH): 42.3 ML
BH CV ECHO MEAS - FS: 22.1 %
BH CV ECHO MEAS - IVS/LVPW: 0.98
BH CV ECHO MEAS - IVSD: 1.2 CM
BH CV ECHO MEAS - LAD MAJOR: 5.5 CM
BH CV ECHO MEAS - LAT PEAK E' VEL: 9.4 CM/SEC
BH CV ECHO MEAS - LATERAL E/E' RATIO: 9.8
BH CV ECHO MEAS - LV DIASTOLIC VOL/BSA (35-75): 66.5 ML/M^2
BH CV ECHO MEAS - LV MASS(C)D: 180.5 GRAMS
BH CV ECHO MEAS - LV MASS(C)DI: 93.8 GRAMS/M^2
BH CV ECHO MEAS - LV SYSTOLIC VOL/BSA (12-30): 20.8 ML/M^2
BH CV ECHO MEAS - LVIDD: 4.2 CM
BH CV ECHO MEAS - LVIDS: 3.2 CM
BH CV ECHO MEAS - LVLD AP2: 8 CM
BH CV ECHO MEAS - LVLD AP4: 8 CM
BH CV ECHO MEAS - LVLS AP2: 6.6 CM
BH CV ECHO MEAS - LVLS AP4: 6.6 CM
BH CV ECHO MEAS - LVPWD: 1.2 CM
BH CV ECHO MEAS - MED PEAK E' VEL: 7.1 CM/SEC
BH CV ECHO MEAS - MEDIAL E/E' RATIO: 12.9
BH CV ECHO MEAS - MV A MAX VEL: 127.7 CM/SEC
BH CV ECHO MEAS - MV DEC TIME: 0.2 SEC
BH CV ECHO MEAS - MV E MAX VEL: 93.8 CM/SEC
BH CV ECHO MEAS - MV E/A: 0.73
BH CV ECHO MEAS - PA ACC SLOPE: 660.8 CM/SEC^2
BH CV ECHO MEAS - PA ACC TIME: 0.12 SEC
BH CV ECHO MEAS - PA PR(ACCEL): 26.7 MMHG
BH CV ECHO MEAS - RAP SYSTOLE: 3 MMHG
BH CV ECHO MEAS - RVSP: 15 MMHG
BH CV ECHO MEAS - SI(CUBED): 19.9 ML/M^2
BH CV ECHO MEAS - SI(MOD-SP2): 41.1 ML/M^2
BH CV ECHO MEAS - SI(MOD-SP4): 45.7 ML/M^2
BH CV ECHO MEAS - SI(TEICH): 18.1 ML/M^2
BH CV ECHO MEAS - SV(CUBED): 38.2 ML
BH CV ECHO MEAS - SV(MOD-SP2): 79 ML
BH CV ECHO MEAS - SV(MOD-SP4): 88 ML
BH CV ECHO MEAS - SV(TEICH): 34.8 ML
BH CV ECHO MEAS - TAPSE (>1.6): 1.6 CM2
BH CV ECHO MEAS - TR MAX PG: 11 MMHG
BH CV ECHO MEAS - TR MAX VEL: 161.5 CM/SEC
BH CV ECHO MEASUREMENTS AVERAGE E/E' RATIO: 11.37
BH CV VAS BP RIGHT ARM: NORMAL MMHG
BH CV XLRA - RV BASE: 3.7 CM
BH CV XLRA - RV LENGTH: 7.4 CM
BH CV XLRA - RV MID: 3.5 CM
BH CV XLRA - TDI S': 9.32 CM/SEC
BH CV XLRA MEAS LEFT CCA RATIO VEL: 148 CM/SEC
BH CV XLRA MEAS LEFT DIST CCA EDV: 21.6 CM/SEC
BH CV XLRA MEAS LEFT DIST CCA PSV: 106.1 CM/SEC
BH CV XLRA MEAS LEFT DIST ICA EDV: 39.3 CM/SEC
BH CV XLRA MEAS LEFT DIST ICA PSV: 154.5 CM/SEC
BH CV XLRA MEAS LEFT ICA RATIO VEL: 127 CM/SEC
BH CV XLRA MEAS LEFT ICA/CCA RATIO: 0.86
BH CV XLRA MEAS LEFT MID CCA EDV: 19.6 CM/SEC
BH CV XLRA MEAS LEFT MID CCA PSV: 149.3 CM/SEC
BH CV XLRA MEAS LEFT MID ICA EDV: 35.6 CM/SEC
BH CV XLRA MEAS LEFT MID ICA PSV: 127.8 CM/SEC
BH CV XLRA MEAS LEFT PROX CCA EDV: 36.1 CM/SEC
BH CV XLRA MEAS LEFT PROX CCA PSV: 240.4 CM/SEC
BH CV XLRA MEAS LEFT PROX ECA EDV: 10.5 CM/SEC
BH CV XLRA MEAS LEFT PROX ECA PSV: 149.3 CM/SEC
BH CV XLRA MEAS LEFT PROX ICA EDV: 23.7 CM/SEC
BH CV XLRA MEAS LEFT PROX ICA PSV: 101.3 CM/SEC
BH CV XLRA MEAS LEFT PROX SCLA PSV: 228 CM/SEC
BH CV XLRA MEAS LEFT VERTEBRAL A EDV: 20.1 CM/SEC
BH CV XLRA MEAS LEFT VERTEBRAL A PSV: 71.7 CM/SEC
BH CV XLRA MEAS RIGHT CCA RATIO VEL: 112 CM/SEC
BH CV XLRA MEAS RIGHT DIST CCA EDV: 23 CM/SEC
BH CV XLRA MEAS RIGHT DIST CCA PSV: 111.7 CM/SEC
BH CV XLRA MEAS RIGHT DIST ICA EDV: 37 CM/SEC
BH CV XLRA MEAS RIGHT DIST ICA PSV: 132 CM/SEC
BH CV XLRA MEAS RIGHT ICA RATIO VEL: 124 CM/SEC
BH CV XLRA MEAS RIGHT ICA/CCA RATIO: 1.1
BH CV XLRA MEAS RIGHT MID CCA EDV: 23 CM/SEC
BH CV XLRA MEAS RIGHT MID CCA PSV: 113.1 CM/SEC
BH CV XLRA MEAS RIGHT MID ICA EDV: 29.3 CM/SEC
BH CV XLRA MEAS RIGHT MID ICA PSV: 115.2 CM/SEC
BH CV XLRA MEAS RIGHT PROX CCA EDV: 23.6 CM/SEC
BH CV XLRA MEAS RIGHT PROX CCA PSV: 151.5 CM/SEC
BH CV XLRA MEAS RIGHT PROX ECA EDV: 11 CM/SEC
BH CV XLRA MEAS RIGHT PROX ECA PSV: 151.6 CM/SEC
BH CV XLRA MEAS RIGHT PROX ICA EDV: 30 CM/SEC
BH CV XLRA MEAS RIGHT PROX ICA PSV: 124.3 CM/SEC
BH CV XLRA MEAS RIGHT PROX SCLA PSV: 208 CM/SEC
BH CV XLRA MEAS RIGHT VERTEBRAL A EDV: 19.6 CM/SEC
BH CV XLRA MEAS RIGHT VERTEBRAL A PSV: 67.3 CM/SEC
BUN BLD-MCNC: 36 MG/DL (ref 8–23)
BUN/CREAT SERPL: 22.9 (ref 7–25)
CALCIUM SPEC-SCNC: 8.8 MG/DL (ref 8.6–10.5)
CHLORIDE SERPL-SCNC: 107 MMOL/L (ref 98–107)
CO2 SERPL-SCNC: 20 MMOL/L (ref 22–29)
CREAT BLD-MCNC: 1.57 MG/DL (ref 0.57–1)
DEPRECATED RDW RBC AUTO: 52.3 FL (ref 37–54)
ERYTHROCYTE [DISTWIDTH] IN BLOOD BY AUTOMATED COUNT: 13.4 % (ref 12.3–15.4)
GFR SERPL CREATININE-BSD FRML MDRD: 33 ML/MIN/1.73
GLUCOSE BLD-MCNC: 331 MG/DL (ref 65–99)
GLUCOSE BLDC GLUCOMTR-MCNC: 294 MG/DL (ref 70–130)
GLUCOSE BLDC GLUCOMTR-MCNC: 316 MG/DL (ref 70–130)
GLUCOSE BLDC GLUCOMTR-MCNC: 328 MG/DL (ref 70–130)
GLUCOSE BLDC GLUCOMTR-MCNC: 401 MG/DL (ref 70–130)
HCT VFR BLD AUTO: 43.4 % (ref 34–46.6)
HGB BLD-MCNC: 12.1 G/DL (ref 12–15.9)
LEFT ATRIUM VOLUME INDEX: 36.4 ML/M^2
LEFT ATRIUM VOLUME: 70 ML
LV EF 2D ECHO EST: 65 %
MCH RBC QN AUTO: 29.2 PG (ref 26.6–33)
MCHC RBC AUTO-ENTMCNC: 27.9 G/DL (ref 31.5–35.7)
MCV RBC AUTO: 104.6 FL (ref 79–97)
PLATELET # BLD AUTO: 201 10*3/MM3 (ref 140–450)
PMV BLD AUTO: 9.2 FL (ref 6–12)
POTASSIUM BLD-SCNC: 5 MMOL/L (ref 3.5–5.2)
RBC # BLD AUTO: 4.15 10*6/MM3 (ref 3.77–5.28)
SODIUM BLD-SCNC: 137 MMOL/L (ref 136–145)
WBC NRBC COR # BLD: 9.48 10*3/MM3 (ref 3.4–10.8)

## 2019-08-04 PROCEDURE — 85027 COMPLETE CBC AUTOMATED: CPT | Performed by: INTERNAL MEDICINE

## 2019-08-04 PROCEDURE — 93005 ELECTROCARDIOGRAM TRACING: CPT | Performed by: PHYSICIAN ASSISTANT

## 2019-08-04 PROCEDURE — 93010 ELECTROCARDIOGRAM REPORT: CPT | Performed by: INTERNAL MEDICINE

## 2019-08-04 PROCEDURE — 99232 SBSQ HOSP IP/OBS MODERATE 35: CPT | Performed by: INTERNAL MEDICINE

## 2019-08-04 PROCEDURE — 25010000002 CEFTRIAXONE PER 250 MG: Performed by: INTERNAL MEDICINE

## 2019-08-04 PROCEDURE — 82962 GLUCOSE BLOOD TEST: CPT

## 2019-08-04 PROCEDURE — 80048 BASIC METABOLIC PNL TOTAL CA: CPT | Performed by: INTERNAL MEDICINE

## 2019-08-04 PROCEDURE — 99233 SBSQ HOSP IP/OBS HIGH 50: CPT | Performed by: INTERNAL MEDICINE

## 2019-08-04 PROCEDURE — 25010000002 HEPARIN (PORCINE) PER 1000 UNITS: Performed by: INTERNAL MEDICINE

## 2019-08-04 PROCEDURE — 63710000001 INSULIN DETEMIR PER 5 UNITS: Performed by: INTERNAL MEDICINE

## 2019-08-04 RX ORDER — HEPARIN SODIUM 5000 [USP'U]/ML
5000 INJECTION, SOLUTION INTRAVENOUS; SUBCUTANEOUS EVERY 8 HOURS SCHEDULED
Status: DISCONTINUED | OUTPATIENT
Start: 2019-08-04 | End: 2019-08-06 | Stop reason: HOSPADM

## 2019-08-04 RX ADMIN — INSULIN LISPRO 7 UNITS: 100 INJECTION, SOLUTION INTRAVENOUS; SUBCUTANEOUS at 11:39

## 2019-08-04 RX ADMIN — INSULIN DETEMIR 10 UNITS: 100 INJECTION, SOLUTION SUBCUTANEOUS at 20:20

## 2019-08-04 RX ADMIN — INSULIN LISPRO 5 UNITS: 100 INJECTION, SOLUTION INTRAVENOUS; SUBCUTANEOUS at 09:00

## 2019-08-04 RX ADMIN — SODIUM CHLORIDE, PRESERVATIVE FREE 3 ML: 5 INJECTION INTRAVENOUS at 20:20

## 2019-08-04 RX ADMIN — FLUTICASONE PROPIONATE 2 SPRAY: 50 SPRAY, METERED NASAL at 08:59

## 2019-08-04 RX ADMIN — CEFTRIAXONE SODIUM 1 G: 1 INJECTION, POWDER, FOR SOLUTION INTRAMUSCULAR; INTRAVENOUS at 08:59

## 2019-08-04 RX ADMIN — ASPIRIN 325 MG ORAL TABLET 325 MG: 325 PILL ORAL at 08:59

## 2019-08-04 RX ADMIN — HEPARIN SODIUM 5000 UNITS: 5000 INJECTION INTRAVENOUS; SUBCUTANEOUS at 14:14

## 2019-08-04 RX ADMIN — CHOLESTYRAMINE 4 G: 4 POWDER, FOR SUSPENSION ORAL at 17:09

## 2019-08-04 RX ADMIN — LEVOTHYROXINE SODIUM 100 MCG: 100 TABLET ORAL at 06:13

## 2019-08-04 RX ADMIN — INSULIN LISPRO 4 UNITS: 100 INJECTION, SOLUTION INTRAVENOUS; SUBCUTANEOUS at 20:18

## 2019-08-04 RX ADMIN — HEPARIN SODIUM 5000 UNITS: 5000 INJECTION INTRAVENOUS; SUBCUTANEOUS at 20:18

## 2019-08-04 RX ADMIN — CHOLECALCIFEROL CAP 125 MCG (5000 UNIT) 5000 UNITS: 125 CAP at 09:00

## 2019-08-04 RX ADMIN — CHOLESTYRAMINE 4 G: 4 POWDER, FOR SUSPENSION ORAL at 11:40

## 2019-08-04 RX ADMIN — ATORVASTATIN CALCIUM 20 MG: 20 TABLET, FILM COATED ORAL at 20:18

## 2019-08-04 RX ADMIN — SODIUM CHLORIDE, PRESERVATIVE FREE 3 ML: 5 INJECTION INTRAVENOUS at 08:59

## 2019-08-04 RX ADMIN — INSULIN LISPRO 5 UNITS: 100 INJECTION, SOLUTION INTRAVENOUS; SUBCUTANEOUS at 17:09

## 2019-08-04 RX ADMIN — CITALOPRAM HYDROBROMIDE 20 MG: 20 TABLET ORAL at 09:00

## 2019-08-04 RX ADMIN — SODIUM CHLORIDE 100 ML/HR: 9 INJECTION, SOLUTION INTRAVENOUS at 01:38

## 2019-08-04 NOTE — PROGRESS NOTES
[facility] Medicine Services  PROGRESS NOTE    Patient Name: Sandrine Verma  : 1950  MRN: [id number]    Date of Admission: 2019  Length of Stay: 1  Primary Care Physician: Chelsey Bhagat MD    Subjective   Subjective     CC: f/u syncope    HPI: Up in bed without family in room. Feeling better. Breathing comfortably. Urinating well.    Review of Systems  Gen- No fevers, chills  CV- No chest pain, palpitations  Resp- No cough, dyspnea  GI- No N/V/D, abd pain    Otherwise ROS is negative except as mentioned in the HPI.    Objective   Objective     Vital Signs:   Temp:  [98 °F (36.7 °C)-99.7 °F (37.6 °C)] 98.2 °F (36.8 °C)  Heart Rate:  [76-94] 84  Resp:  [18] 18  BP: (122-131)/(52-71) 131/60        Physical Exam:  Constitutional: No acute distress, awake, alert, up in bed, looks better  HENT: NCAT, mucous membranes moist  Respiratory: Clear to auscultation bilaterally, respiratory effort normal   Cardiovascular: RRR, no murmurs, rubs, or gallops, palpable pedal pulses bilaterally  Gastrointestinal: Positive bowel sounds, soft, nontender, nondistended, obese  Musculoskeletal: RUE edema improved from yesterday.  Psychiatric: Appropriate affect, cooperative  Neurologic: Oriented x 3, strength symmetric in all extremities, Cranial Nerves grossly intact to confrontation, speech clear  Skin: No rashes    Results Reviewed:  I have personally reviewed current lab, radiology, and data and agree.    Results from last 7 days   Lab Units 19  04319  0111   WBC 10*3/mm3 9.48 10.69   HEMOGLOBIN g/dL 12.1 13.5   HEMATOCRIT % 43.4 43.6   PLATELETS 10*3/mm3 201 241     Results from last 7 days   Lab Units 19  04319  0754 19  0111   SODIUM mmol/L 137 133* 137   POTASSIUM mmol/L 5.0 5.2 5.4*   CHLORIDE mmol/L 107 100 99   CO2 mmol/L 20.0* 20.0* 24.0   BUN mg/dL 36* 37* 38*   CREATININE mg/dL 1.57* 1.76* 1.62*   GLUCOSE mg/dL 331* 275* 236*   CALCIUM mg/dL 8.8 9.0  9.5   ALT (SGPT) U/L  --   --  13   AST (SGOT) U/L  --   --  17   TROPONIN T ng/mL  --   --  <0.010   PROBNP pg/mL  --   --  512.4     Estimated Creatinine Clearance: 35.2 mL/min (A) (by C-G formula based on SCr of 1.57 mg/dL (H)).    Microbiology Results Abnormal     None          Imaging Results (last 24 hours)     Procedure Component Value Units Date/Time    NM Lung Ventilation Perfusion [[id number]] Collected:  08/03/19 1708     Updated:  08/03/19 1830    Narrative:       EXAMINATION: NM LUNG VENTILATION PERFUSION - 08/03/2019     INDICATION: Chest pain, shortness of breath. Evaluate for pulmonary  embolus.      TECHNIQUE: 13.77 mCi of Xenon-133 was inhaled and wash-in, equilibrium,  and  washout images were obtained in the posterior projection. Perfusion  imaging was obtained after intravenous administration of 5.47 mCi of  Technetium 99 MAA.     COMPARISON: Chest x-ray 08/03/2019.     FINDINGS: There is enlargement identified of the heart. Diminished  perfusion and ventilation in the left lower lung field. Findings do  correlate to a pleural effusion on the chest x-ray. There is homogeneous  uptake of tracer seen on ventilation imaging throughout the remainder of  the lung fields. There is homogeneous uptake of tracer seen on perfusion  imaging throughout the lung fields.       Impression:       Pleural effusion identified at the left lung base with  enlargement of the heart. Low probability for pulmonary embolism.      DICTATED:   08/03/2019  EDITED/ls :   08/03/2019                   I have reviewed the medications:  Scheduled Meds:  aspirin 325 mg Oral Daily   atorvastatin 20 mg Oral Nightly   ceftriaxone 1 g Intravenous Q24H   cholestyramine light 1 packet Oral BID With Meals   citalopram 20 mg Oral Daily   fluticasone 2 spray Each Nare Daily   heparin (porcine) 5,000 Units Subcutaneous Q8H   insulin detemir 10 Units Subcutaneous Nightly   insulin lispro 0-7 Units Subcutaneous 4x Daily With Meals & Nightly    levothyroxine 100 mcg Oral Q AM   sodium chloride 3 mL Intravenous Q12H   vitamin D3 5,000 Units Oral Daily     Continuous Infusions:   PRN Meds:.dextrose  •  dextrose  •  glucagon (human recombinant)  •  ondansetron **OR** ondansetron  •  sodium chloride      Assessment/Plan   Assessment / Plan     Active Hospital Problems    Diagnosis  POA   • **Syncope [R55]  Yes   • Acute respiratory failure with hypoxia and hypercapnia (CMS/HCC) [J96.01, J96.02]  Yes   • Bacteriuria [R82.71]  Yes   • Coronary artery disease involving native coronary artery of native heart with angina pectoris (CMS/HCC) [I25.119]  Yes   • Essential hypertension [I10]  Yes   • Hyperlipidemia LDL goal <70 [E78.5]  Yes   • Type 2 diabetes mellitus, uncontrolled [E11.29, Z79.4]  Not Applicable   • Hypothyroidism [E03.9]  Yes   • Renal insufficiency [N28.9]  Yes   • Hyperkalemia [E87.5]  Yes   • Hyponatremia [E87.1]  Yes   • Bifascicular bundle branch block [I45.2]  Yes      Resolved Hospital Problems   No resolved problems to display.        Brief Hospital Course to date:  Sandrine Verma is a 69 y.o. female transferred from OSH where she presented with syncope and SOA found to have bradycardia w/ bifascicular heart block along with acute hypoxic and hypercarbic respiratory failure.    A/P:    Syncopal episode  Acute respiratory failure with hypoxia & hypercapnia   --Inciting event still remains unclear with negative orthostatics, no evidence of PE.  --Echo and carotid duplex pending.  --Cardiology following. D/W PA this am. For ischemic eval in am w/ further plan regarding arrhythmia to follow.  --Unclear why she remains so hypoxic as well. CXR is largely unremarkable as was her CT chest from OSH. Echo pending. Needs to ambulate.     Bradycardia, resolved.  New bifascicular block on EKG  CAD s/p CABG/stenting, HTN, HLD  --EKG at OSH sinus rhythm with first degree AV block, right bundle branch block and left anterior fascicular block which has  since resolved.  --Cardiology follows, plan as outlined above.  --Hold BB indefinitely.  --Monitor on telemetry      UMAIR/CKD  --Better after IV fluids, urine sodium indicates prerenal cause though no hx to suggest this. Unclear baseline but knows she has underlying CKD due to DM2.  --Avoid nephrotoxins.  --Stop IVF. Recheck in am.    UTI  --UA at OSH +3 bacteria, u/a here abx modified.   --IV rocephin x 5 days.    RUE edema  --From infiltration of IV contrast at OSH. Elevate, warm compress, pain control.      T2DM  --SSI      Hypothyroidism  --Continue synthroid     DVT prophylaxis:  SQH    Disposition: I expect the patient to be discharged once above evaluation complete.    CODE STATUS:   Code Status and Medical Interventions:   Ordered at: 08/03/19 0133     Level Of Support Discussed With:    Patient     Code Status:    CPR     Medical Interventions (Level of Support Prior to Arrest):    Full         Electronically signed by Gi Gray II, DO, 08/04/19, 9:24 AM.

## 2019-08-04 NOTE — PROGRESS NOTES
[facility]  Cardiology Progress Note      Chief Complaint/Reason for service:    · Syncope  · CAD  · Dyspnea on exertion  · Bifascicular block             Past medical, surgical, social and family history reviewed in the patient's electronic medical record.         Vital Sign Min/Max for last 24 hours  Temp  Min: 98.2 °F (36.8 °C)  Max: 99.7 °F (37.6 °C)   BP  Min: 122/52  Max: 131/60   Pulse  Min: 74  Max: 94   Resp  Min: 18  Max: 18   SpO2  Min: 95 %  Max: 97 %   Flow (L/min)  Min: 2  Max: 5      Intake/Output Summary (Last 24 hours) at 8/4/2019 1221  Last data filed at 8/4/2019 0900  Gross per 24 hour   Intake 3801 ml   Output 200 ml   Net 3601 ml           Physical Exam   Constitutional: She is oriented to person, place, and time. She appears well-developed and well-nourished.   HENT:   Head: Normocephalic.   Neck: No JVD present. Carotid bruit is not present.   Cardiovascular: Normal rate, regular rhythm, normal heart sounds and intact distal pulses. Exam reveals no gallop and no friction rub.   No murmur heard.  Pulmonary/Chest: Effort normal and breath sounds normal.   Abdominal: Soft.   Musculoskeletal: She exhibits no edema.   Neurological: She is alert and oriented to person, place, and time.   Skin: Skin is warm and dry. No cyanosis. Nails show no clubbing.   Psychiatric: She has a normal mood and affect. Her behavior is normal.       Tele: Sinus rhythm     Results Review (reviewed the patient's recent labs in the electronic medical record):     EKG:  Sinus rhythm with first-degree block 8/4/2019          Results from last 7 days   Lab Units 08/04/19  0439 08/03/19  0754 08/03/19  0111   SODIUM mmol/L 137 133* 137   POTASSIUM mmol/L 5.0 5.2 5.4*   CHLORIDE mmol/L 107 100 99   BUN mg/dL 36* 37* 38*   CREATININE mg/dL 1.57* 1.76* 1.62*     Results from last 7 days   Lab Units 08/03/19  0111   TROPONIN T ng/mL <0.010     Results from last 7 days   Lab Units  08/04/19  0439 08/03/19  0111   WBC 10*3/mm3 9.48 10.69   HEMOGLOBIN g/dL 12.1 13.5   HEMATOCRIT % 43.4 43.6   PLATELETS 10*3/mm3 201 241       Lab Results   Component Value Date    HGBA1C 8.80 (H) 08/03/2019       Lab Results   Component Value Date    CHOL 151 08/03/2019    TRIG 250 (H) 08/03/2019    HDL 44 08/03/2019    LDL 57 08/03/2019              Active Hospital Problems    Diagnosis   • **Syncope     · Nonexertional syncope, 8/2/2019  · EKG (8/2/2019): marked sinus bradycardia 39 bpm and bifascicular block (on metoprolol 12.5 mg bid)  · Echo (8/4/2019): LVEF 65%.  No significant valve abnormality.     • Acute respiratory failure with hypoxia and hypercapnia (CMS/HCC)     · Normal VQ scan, 8/3/2019  · Echo (8/4/2019): Normal LVEF.  No significant valve abnormality.     • Coronary artery disease involving native coronary artery of native heart with angina pectoris (CMS/HCC)     · CABG at [place] 2007  · Multiple PCI's last 2009 with Dr. Woodward  · Functional class III heart failure symptoms, 8/2019     • Type 2 diabetes mellitus, uncontrolled   • Renal insufficiency     · Unknown baseline Cr     • Bifascicular bundle branch block   • Bacteriuria   • Essential hypertension   • Hyperlipidemia LDL goal <70     · High intensity statin therapy indicated     • Hypothyroidism              · Continue to hold beta-blocker  · Pharmacologic nuclear stress test tomorrow  · If no ischemia, will discuss case with EP      Elliot Ramos IV, MD  8/4/2019

## 2019-08-04 NOTE — PLAN OF CARE
Problem: Patient Care Overview  Goal: Plan of Care Review  Outcome: Ongoing (interventions implemented as appropriate)   08/04/19 9250   Coping/Psychosocial   Plan of Care Reviewed With patient;family   Plan of Care Review   Progress improving   OTHER   Outcome Summary PT with no new complaints. VSS, weaned from 5L NC to 1LNC. IV Abx for UTI. Plan for stress test tomorrow.      Goal: Individualization and Mutuality  Outcome: Ongoing (interventions implemented as appropriate)    Goal: Discharge Needs Assessment  Outcome: Ongoing (interventions implemented as appropriate)    Goal: Interprofessional Rounds/Family Conf  Outcome: Ongoing (interventions implemented as appropriate)      Problem: Syncope (Adult)  Goal: Identify Related Risk Factors and Signs and Symptoms  Outcome: Ongoing (interventions implemented as appropriate)    Goal: Physical Safety/Health Maintenance  Outcome: Ongoing (interventions implemented as appropriate)    Goal: Optimal Emotional/Functional [place]  Outcome: Ongoing (interventions implemented as appropriate)      Problem: ARDS (Acute Resp Distress Syndrome) (Adult)  Goal: Signs and Symptoms of Listed Potential Problems Will be Absent, Minimized or Managed (ARDS)  Outcome: Ongoing (interventions implemented as appropriate)

## 2019-08-04 NOTE — PLAN OF CARE
Problem: Patient Care Overview  Goal: Plan of Care Review  Outcome: Ongoing (interventions implemented as appropriate)   08/04/19 8843   Coping/Psychosocial   Plan of Care Reviewed With patient   Plan of Care Review   Progress improving   OTHER   Outcome Summary pt rested well this shift, VSS, NSR, 5L nasal canula, pt adequate urine output via beside commode, no complaints of pain

## 2019-08-04 NOTE — PROGRESS NOTES
Discharge Planning Assessment  [facility]     Patient Name: Sandrine Verma  MRN: [id number]  Today's Date: 8/4/2019    Admit Date: 8/2/2019    Discharge Needs Assessment     Row Name 08/04/19 1413       Living Environment    Lives With  spouse    Name(s) of Who Lives With Patient  Berto Verma-spouse    Current Living Arrangements  home/apartment/condo Lives in [facility].     Primary Care Provided by  self    Provides Primary Care For  no one    Family Caregiver if Needed  spouse    Family Caregiver Names  Berto-    Quality of Family Relationships  supportive;involved;helpful    Able to Return to Prior Arrangements  yes       Resource/Environmental Concerns    Resource/Environmental Concerns  none    Transportation Concerns  car, none       Transition Planning    Patient/Family Anticipates Transition to  home with family    Patient/Family Anticipated Services at Transition  none    Transportation Anticipated  family or friend will provide       Discharge Needs Assessment    Readmission Within the Last 30 Days  no previous admission in last 30 days    Concerns to be Addressed  denies needs/concerns at this time    Equipment Currently Used at Home  grab bar;glucometer    Anticipated Changes Related to Illness  none    Equipment Needed After Discharge  none        Discharge Plan     Row Name 08/04/19 1415       Plan    Plan  Home with     Patient/Family in Agreement with Plan  yes    Plan Comments  Met with pt at . She is independent of ADL's, drives and cooks for her and her . She lives with him in [facility]. Denies using HH. She plans to return home at d/c. She is currently wearing oxygen and doesn't wear at home. CM will follow for d/c needs.     Final Discharge Disposition Code  01 - home or self-care        Destination      No service coordination in this encounter.      Durable Medical Equipment      No service coordination in this encounter.      Dialysis/Infusion      No  service coordination in this encounter.      Home Medical Care      No service coordination in this encounter.      Therapy      No service coordination in this encounter.      Community Resources      No service coordination in this encounter.          Demographic Summary     Row Name 08/04/19 1412       General Information    Referral Source  admission list    Preferred Language  English     Used During This Interaction  no    General Information Comments  PCP is Chelsey Bhagat       Contact Information    Permission Granted to Share Info With          Functional Status     Row Name 08/04/19 1412       Functional Status    Usual Activity Tolerance  good    Current Activity Tolerance  moderate    Functional Status Comments  Pt is independent and drives       Functional Status, IADL    Medications  independent    Meal Preparation  independent    Housekeeping  independent    Laundry  independent    Shopping  independent       Employment/    Employment Status  retired    Employment/ Comments  Has [employer] with prescription coverage that is affordable. Uses [employer] in [place].         Psychosocial    No documentation.       Abuse/Neglect    No documentation.       Legal    No documentation.       Substance Abuse    No documentation.       Patient Forms    No documentation.           Nisa Christine

## 2019-08-05 ENCOUNTER — APPOINTMENT (OUTPATIENT)
Dept: CARDIOLOGY | Facility: HOSPITAL | Age: 69
End: 2019-08-05

## 2019-08-05 LAB
ANION GAP SERPL CALCULATED.3IONS-SCNC: 8 MMOL/L (ref 5–15)
BACTERIA SPEC AEROBE CULT: NORMAL
BH CV STRESS BP STAGE 1: NORMAL
BH CV STRESS BP STAGE 3: NORMAL
BH CV STRESS COMMENTS STAGE 1: NORMAL
BH CV STRESS DOSE REGADENOSON STAGE 1: 0.4
BH CV STRESS DURATION MIN STAGE 1: 1
BH CV STRESS DURATION MIN STAGE 2: 1
BH CV STRESS DURATION MIN STAGE 3: 1
BH CV STRESS DURATION MIN STAGE 4: 1
BH CV STRESS DURATION SEC STAGE 1: 0
BH CV STRESS DURATION SEC STAGE 2: 0
BH CV STRESS DURATION SEC STAGE 3: 0
BH CV STRESS DURATION SEC STAGE 4: 0
BH CV STRESS HR STAGE 1: 74
BH CV STRESS HR STAGE 2: 86
BH CV STRESS HR STAGE 3: 86
BH CV STRESS HR STAGE 4: 84
BH CV STRESS PROTOCOL 1: NORMAL
BH CV STRESS RECOVERY BP: NORMAL MMHG
BH CV STRESS RECOVERY HR: 82 BPM
BH CV STRESS RECOVERY O2: 98 %
BH CV STRESS STAGE 1: 1
BH CV STRESS STAGE 2: 2
BH CV STRESS STAGE 3: 3
BH CV STRESS STAGE 4: 4
BUN BLD-MCNC: 23 MG/DL (ref 8–23)
BUN/CREAT SERPL: 19.3 (ref 7–25)
CALCIUM SPEC-SCNC: 9 MG/DL (ref 8.6–10.5)
CHLORIDE SERPL-SCNC: 106 MMOL/L (ref 98–107)
CO2 SERPL-SCNC: 25 MMOL/L (ref 22–29)
CREAT BLD-MCNC: 1.19 MG/DL (ref 0.57–1)
GFR SERPL CREATININE-BSD FRML MDRD: 45 ML/MIN/1.73
GLUCOSE BLD-MCNC: 261 MG/DL (ref 65–99)
GLUCOSE BLDC GLUCOMTR-MCNC: 210 MG/DL (ref 70–130)
GLUCOSE BLDC GLUCOMTR-MCNC: 273 MG/DL (ref 70–130)
GLUCOSE BLDC GLUCOMTR-MCNC: 347 MG/DL (ref 70–130)
GLUCOSE BLDC GLUCOMTR-MCNC: 377 MG/DL (ref 70–130)
LV EF NUC BP: 59 %
MAXIMAL PREDICTED HEART RATE: 150 BPM
PERCENT MAX PREDICTED HR: 57.33 %
POTASSIUM BLD-SCNC: 4.9 MMOL/L (ref 3.5–5.2)
SODIUM BLD-SCNC: 139 MMOL/L (ref 136–145)
STRESS BASELINE BP: NORMAL MMHG
STRESS BASELINE HR: 67 BPM
STRESS O2 SAT REST: 97 %
STRESS PERCENT HR: 67 %
STRESS POST O2 SAT PEAK: 99 %
STRESS POST PEAK BP: NORMAL MMHG
STRESS POST PEAK HR: 86 BPM
STRESS TARGET HR: 128 BPM

## 2019-08-05 PROCEDURE — 25010000002 CEFTRIAXONE PER 250 MG: Performed by: INTERNAL MEDICINE

## 2019-08-05 PROCEDURE — 25010000002 ONDANSETRON PER 1 MG: Performed by: PHYSICIAN ASSISTANT

## 2019-08-05 PROCEDURE — 93018 CV STRESS TEST I&R ONLY: CPT | Performed by: INTERNAL MEDICINE

## 2019-08-05 PROCEDURE — 25010000002 HEPARIN (PORCINE) PER 1000 UNITS: Performed by: INTERNAL MEDICINE

## 2019-08-05 PROCEDURE — 82962 GLUCOSE BLOOD TEST: CPT

## 2019-08-05 PROCEDURE — 93017 CV STRESS TEST TRACING ONLY: CPT

## 2019-08-05 PROCEDURE — 99232 SBSQ HOSP IP/OBS MODERATE 35: CPT | Performed by: INTERNAL MEDICINE

## 2019-08-05 PROCEDURE — 80048 BASIC METABOLIC PNL TOTAL CA: CPT | Performed by: INTERNAL MEDICINE

## 2019-08-05 PROCEDURE — 78492 MYOCRD IMG PET MLT RST&STRS: CPT

## 2019-08-05 PROCEDURE — 63710000001 INSULIN DETEMIR PER 5 UNITS: Performed by: INTERNAL MEDICINE

## 2019-08-05 PROCEDURE — A9555 RB82 RUBIDIUM: HCPCS | Performed by: INTERNAL MEDICINE

## 2019-08-05 PROCEDURE — 99232 SBSQ HOSP IP/OBS MODERATE 35: CPT | Performed by: PHYSICIAN ASSISTANT

## 2019-08-05 PROCEDURE — 25010000002 REGADENOSON 0.4 MG/5ML SOLUTION: Performed by: INTERNAL MEDICINE

## 2019-08-05 PROCEDURE — 0 RUBIDIUM CHLORIDE: Performed by: INTERNAL MEDICINE

## 2019-08-05 PROCEDURE — 78492 MYOCRD IMG PET MLT RST&STRS: CPT | Performed by: INTERNAL MEDICINE

## 2019-08-05 RX ORDER — ACETAMINOPHEN 325 MG/1
650 TABLET ORAL EVERY 6 HOURS PRN
Status: DISCONTINUED | OUTPATIENT
Start: 2019-08-05 | End: 2019-08-06 | Stop reason: HOSPADM

## 2019-08-05 RX ADMIN — INSULIN DETEMIR 20 UNITS: 100 INJECTION, SOLUTION SUBCUTANEOUS at 20:30

## 2019-08-05 RX ADMIN — RUBIDIUM CHLORIDE RB-82 1 DOSE: 150 INJECTION, SOLUTION INTRAVENOUS at 10:35

## 2019-08-05 RX ADMIN — INSULIN LISPRO 4 UNITS: 100 INJECTION, SOLUTION INTRAVENOUS; SUBCUTANEOUS at 08:48

## 2019-08-05 RX ADMIN — SODIUM CHLORIDE, PRESERVATIVE FREE 3 ML: 5 INJECTION INTRAVENOUS at 08:43

## 2019-08-05 RX ADMIN — ACETAMINOPHEN 650 MG: 325 TABLET, FILM COATED ORAL at 09:21

## 2019-08-05 RX ADMIN — FLUTICASONE PROPIONATE 2 SPRAY: 50 SPRAY, METERED NASAL at 12:32

## 2019-08-05 RX ADMIN — HEPARIN SODIUM 5000 UNITS: 5000 INJECTION INTRAVENOUS; SUBCUTANEOUS at 06:12

## 2019-08-05 RX ADMIN — RUBIDIUM CHLORIDE RB-82 1 DOSE: 150 INJECTION, SOLUTION INTRAVENOUS at 10:50

## 2019-08-05 RX ADMIN — CHOLESTYRAMINE 4 G: 4 POWDER, FOR SUSPENSION ORAL at 17:04

## 2019-08-05 RX ADMIN — ASPIRIN 325 MG ORAL TABLET 325 MG: 325 PILL ORAL at 12:32

## 2019-08-05 RX ADMIN — INSULIN LISPRO 5 UNITS: 100 INJECTION, SOLUTION INTRAVENOUS; SUBCUTANEOUS at 12:32

## 2019-08-05 RX ADMIN — INSULIN LISPRO 6 UNITS: 100 INJECTION, SOLUTION INTRAVENOUS; SUBCUTANEOUS at 20:31

## 2019-08-05 RX ADMIN — SODIUM CHLORIDE, PRESERVATIVE FREE 3 ML: 5 INJECTION INTRAVENOUS at 21:00

## 2019-08-05 RX ADMIN — REGADENOSON 0.4 MG: 0.08 INJECTION, SOLUTION INTRAVENOUS at 10:52

## 2019-08-05 RX ADMIN — CEFTRIAXONE SODIUM 1 G: 1 INJECTION, POWDER, FOR SOLUTION INTRAMUSCULAR; INTRAVENOUS at 08:41

## 2019-08-05 RX ADMIN — ONDANSETRON 4 MG: 2 INJECTION INTRAMUSCULAR; INTRAVENOUS at 10:03

## 2019-08-05 RX ADMIN — INSULIN LISPRO 5 UNITS: 100 INJECTION, SOLUTION INTRAVENOUS; SUBCUTANEOUS at 17:04

## 2019-08-05 RX ADMIN — ATORVASTATIN CALCIUM 20 MG: 20 TABLET, FILM COATED ORAL at 20:31

## 2019-08-05 RX ADMIN — HEPARIN SODIUM 5000 UNITS: 5000 INJECTION INTRAVENOUS; SUBCUTANEOUS at 20:31

## 2019-08-05 RX ADMIN — INSULIN LISPRO 3 UNITS: 100 INJECTION, SOLUTION INTRAVENOUS; SUBCUTANEOUS at 12:32

## 2019-08-05 RX ADMIN — CITALOPRAM HYDROBROMIDE 20 MG: 20 TABLET ORAL at 12:32

## 2019-08-05 RX ADMIN — HEPARIN SODIUM 5000 UNITS: 5000 INJECTION INTRAVENOUS; SUBCUTANEOUS at 17:04

## 2019-08-05 RX ADMIN — LEVOTHYROXINE SODIUM 100 MCG: 100 TABLET ORAL at 06:12

## 2019-08-05 NOTE — PLAN OF CARE
Problem: Patient Care Overview  Goal: Plan of Care Review  Outcome: Ongoing (interventions implemented as appropriate)   08/05/19 6351   Coping/Psychosocial   Plan of Care Reviewed With patient;daughter   Plan of Care Review   Progress no change   OTHER   Outcome Summary pt had stress test completed today no results at this time. awaiting plan per cards ? cath ? PPM       Problem: Syncope (Adult)  Goal: Identify Related Risk Factors and Signs and Symptoms  Outcome: Outcome(s) achieved Date Met: 08/05/19    Goal: Physical Safety/Health Maintenance  Outcome: Ongoing (interventions implemented as appropriate)    Goal: Optimal Emotional/Functional [place]  Outcome: Ongoing (interventions implemented as appropriate)

## 2019-08-05 NOTE — PROGRESS NOTES
Continued Stay Note  [facility]     Patient Name: Sandrine Verma  MRN: [id number]  Today's Date: 8/5/2019    Admit Date: 8/2/2019    Discharge Plan     Row Name 08/05/19 2498       Plan    Plan  Home with family    Patient/Family in Agreement with Plan  yes    Plan Comments  Spoke with pt. and family at bedside. Plans remain to dc to home with family. Awaiting cardiology review for Stress Test this am. Will cont. to follow and make any needed arrangements.     Final Discharge Disposition Code  01 - home or self-care        Discharge Codes    No documentation.       Expected Discharge Date and Time     Expected Discharge Date Expected Discharge Time    Aug 7, 2019             Rachel Grant RN

## 2019-08-05 NOTE — PROGRESS NOTES
" [facility] at [facility] - Progress Note    Sandrine Verma  1950  S467/1    08/05/19, 8:54 AM      Chief Complaint: Following for syncope, CAD, [place], Bifascicular block    Subjective:   Patient is doing well. Has been up walking without syncope or dizziness.  No chest pain.  NPO for stress test today.    She does have slight headache this morning    Review of Systems:  Pertinent positives are listed above and in physical exam.  All others have been reviewed and are negative.      aspirin 325 mg Oral Daily   atorvastatin 20 mg Oral Nightly   ceftriaxone 1 g Intravenous Q24H   cholestyramine light 1 packet Oral BID With Meals   citalopram 20 mg Oral Daily   fluticasone 2 spray Each Nare Daily   heparin (porcine) 5,000 Units Subcutaneous Q8H   insulin detemir 15 Units Subcutaneous Nightly   insulin lispro 0-7 Units Subcutaneous 4x Daily With Meals & Nightly   insulin lispro 5 Units Subcutaneous TID With Meals   levothyroxine 100 mcg Oral Q AM   sodium chloride 3 mL Intravenous Q12H   vitamin D3 5,000 Units Oral Daily       Objective:  Vitals:   height is 157.5 cm (62.01\") and weight is 92.1 kg (203 lb). Her oral temperature is 98.2 °F (36.8 °C). Her blood pressure is 162/68 and her pulse is 72. Her respiration is 18 and oxygen saturation is 95%.     Intake/Output Summary (Last 24 hours) at 8/5/2019 0854  Last data filed at 8/5/2019 0841  Gross per 24 hour   Intake 1080 ml   Output --   Net 1080 ml       Physical Exam:  General:  WN, NAD, A and O x3.  CV:  Normal S1,S2. No murmur, rub, or gallop.  Resp:  CTA Mateo, equal, non-labored.  Abd:  Soft, + BS, no organomegaly. Non-tender to palpation. Obese.  Extrem:  No edema BLE, 2+ pedal/PT pulses.            Results from last 7 days   Lab Units 08/04/19  0439   WBC 10*3/mm3 9.48   HEMOGLOBIN g/dL 12.1   HEMATOCRIT % 43.4   PLATELETS 10*3/mm3 201     Results from last 7 days   Lab Units 08/05/19  0437  08/03/19  0111   SODIUM mmol/L 139   < > 137 "   POTASSIUM mmol/L 4.9   < > 5.4*   CHLORIDE mmol/L 106   < > 99   CO2 mmol/L 25.0   < > 24.0   BUN mg/dL 23   < > 38*   CREATININE mg/dL 1.19*   < > 1.62*   CALCIUM mg/dL 9.0   < > 9.5   BILIRUBIN mg/dL  --   --  0.4   ALK PHOS U/L  --   --  52   ALT (SGPT) U/L  --   --  13   AST (SGOT) U/L  --   --  17   GLUCOSE mg/dL 261*   < > 236*    < > = values in this interval not displayed.         Results from last 7 days   Lab Units 08/03/19  0111   TSH mIU/mL 2.440     Results from last 7 days   Lab Units 08/03/19  0111   CHOLESTEROL mg/dL 151   TRIGLYCERIDES mg/dL 250*   HDL CHOL mg/dL 44   LDL CHOL mg/dL 57     Results from last 7 days   Lab Units 08/03/19  0111   PROBNP pg/mL 512.4       Tele:  NSR    Assessment and Plan:  • **Syncope       · Nonexertional syncope, 8/2/2019  · EKG (8/2/2019): marked sinus bradycardia 39 bpm and bifascicular block (on metoprolol 12.5 mg bid)  · Echo (8/4/2019): LVEF 65%.  No significant valve abnormality.      • Acute respiratory failure with hypoxia and hypercapnia (CMS/HCC)       · Normal VQ scan, 8/3/2019  · Echo (8/4/2019): Normal LVEF.  No significant valve abnormality.      • Coronary artery disease involving native coronary artery of native heart with angina pectoris (CMS/HCC)       · CABG at [place] 2007  · Multiple PCI's last 2009 with Dr. Woodward  · Functional class III heart failure symptoms, 8/2019      • Type 2 diabetes mellitus, uncontrolled   • Renal insufficiency       · Unknown baseline Cr      • Bifascicular bundle branch block   • Bacteriuria   • Essential hypertension   • Hyperlipidemia LDL goal <70       · High intensity statin therapy indicated      • Hypothyroidism         PLAN:  Will order Tylenol for headache.  Will follow up post stress test.  If no ischemia, discuss case with EP.  Continue antibiotics per hospitalist.        I discussed the patients findings and my recommendations with the patient, any present family members, and the nursing staff.  Remy  MD Richard saw and examined patient, verified hx and PE, read all radiographic studies, reviewed labs and micro data, and formulated dx, plan for treatment and all medical decision making.      MAREK Solomon  08/05/19, 8:54 AM

## 2019-08-05 NOTE — PROGRESS NOTES
"    [facility] Medicine Services  PROGRESS NOTE    Patient Name: Sandrine Verma  : 1950  MRN: [id number]    Date of Admission: 2019  Length of Stay: 2  Primary Care Physician: Chelsey Bhagat MD    Subjective   Subjective     CC: f/u syncope    HPI: Lying in bed without family in room. Doing \"fine.\" No palpitaitons, CP.    Review of Systems  Gen- No fevers, chills  CV- No chest pain, palpitations  Resp- No cough, dyspnea  GI- No N/V/D, abd pain    Otherwise ROS is negative except as mentioned in the HPI.    Objective   Objective     Vital Signs:   Temp:  [98.2 °F (36.8 °C)-99 °F (37.2 °C)] 98.2 °F (36.8 °C)  Heart Rate:  [62-82] 72  Resp:  [18] 18  BP: (144-162)/(53-68) 162/68        Physical Exam:  Constitutional: No acute distress, awake, alert, comfortable  HENT: NCAT, mucous membranes moist  Respiratory: Clear to auscultation bilaterally, respiratory effort normal   Cardiovascular: RRR, no murmurs, rubs, or gallops, palpable pedal pulses bilaterally  Gastrointestinal: Positive bowel sounds, soft, nontender, nondistended  Musculoskeletal: No bilateral ankle edema  Psychiatric: Appropriate affect, cooperative  Neurologic: Oriented x 3, strength symmetric in all extremities, Cranial Nerves grossly intact to confrontation, speech clear  Skin: No rashes    Results Reviewed:  I have personally reviewed current lab, radiology, and data and agree.    Results from last 7 days   Lab Units 19  04319  0111   WBC 10*3/mm3 9.48 10.69   HEMOGLOBIN g/dL 12.1 13.5   HEMATOCRIT % 43.4 43.6   PLATELETS 10*3/mm3 201 241     Results from last 7 days   Lab Units 19  0437 19  0439 19  0754 19  0111   SODIUM mmol/L 139 137 133* 137   POTASSIUM mmol/L 4.9 5.0 5.2 5.4*   CHLORIDE mmol/L 106 107 100 99   CO2 mmol/L 25.0 20.0* 20.0* 24.0   BUN mg/dL 23 36* 37* 38*   CREATININE mg/dL 1.19* 1.57* 1.76* 1.62*   GLUCOSE mg/dL 261* 331* 275* 236*   CALCIUM mg/dL 9.0 8.8 " 9.0 9.5   ALT (SGPT) U/L  --   --   --  13   AST (SGOT) U/L  --   --   --  17   TROPONIN T ng/mL  --   --   --  <0.010   PROBNP pg/mL  --   --   --  512.4     Estimated Creatinine Clearance: 46.5 mL/min (A) (by C-G formula based on SCr of 1.19 mg/dL (H)).    Microbiology Results Abnormal     Procedure Component Value - Date/Time    Urine Culture - Urine, Urine, Clean Catch [[id number]]  (Normal) Collected:  08/03/19 1534    Lab Status:  Preliminary result Specimen:  Urine, Clean Catch Updated:  08/04/19 1318     Urine Culture No growth          Results for orders placed during the hospital encounter of 08/02/19   Adult Transthoracic Echo Complete W/ Cont if Necessary Per Protocol    Narrative · Left ventricular systolic function is normal. Estimated EF = 65%.  · Left ventricular wall thickness is consistent with mild concentric   hypertrophy.  · Left atrial cavity size is dilated.  · The cardiac valves are anatomically and functionally normal.          I have reviewed the medications:  Scheduled Meds:  aspirin 325 mg Oral Daily   atorvastatin 20 mg Oral Nightly   ceftriaxone 1 g Intravenous Q24H   cholestyramine light 1 packet Oral BID With Meals   citalopram 20 mg Oral Daily   fluticasone 2 spray Each Nare Daily   heparin (porcine) 5,000 Units Subcutaneous Q8H   insulin detemir 15 Units Subcutaneous Nightly   insulin lispro 0-7 Units Subcutaneous 4x Daily With Meals & Nightly   insulin lispro 5 Units Subcutaneous TID With Meals   levothyroxine 100 mcg Oral Q AM   sodium chloride 3 mL Intravenous Q12H   vitamin D3 5,000 Units Oral Daily     Continuous Infusions:   PRN Meds:.•  acetaminophen  •  dextrose  •  dextrose  •  glucagon (human recombinant)  •  ondansetron **OR** ondansetron  •  sodium chloride      Assessment/Plan   Assessment / Plan     Active Hospital Problems    Diagnosis  POA   • **Syncope [R55]  Yes   • Acute respiratory failure with hypoxia and hypercapnia (CMS/HCC) [J96.01, J96.02]  Yes   •  Bacteriuria [R82.71]  Yes   • Coronary artery disease involving native coronary artery of native heart with angina pectoris (CMS/HCC) [I25.119]  Yes   • Essential hypertension [I10]  Yes   • Hyperlipidemia LDL goal <70 [E78.5]  Yes   • Type 2 diabetes mellitus, uncontrolled [E11.29, Z79.4]  Not Applicable   • Hypothyroidism [E03.9]  Yes   • Renal insufficiency [N28.9]  Yes   • Bifascicular bundle branch block [I45.2]  Yes      Resolved Hospital Problems    Diagnosis Date Resolved POA   • Hypoxia [R09.02] 08/04/2019 Unknown   • Hyperkalemia [E87.5] 08/04/2019 Yes   • Hyponatremia [E87.1] 08/04/2019 Yes        Brief Hospital Course to date:  Sandrine Verma is a 69 y.o. female transferred from OSH where she presented with syncope and SOA found to have bradycardia w/ bifascicular heart block along with acute hypoxic and hypercarbic respiratory failure.     A/P:     Syncopal episode  Acute respiratory failure with hypoxia & hypercapnia   --Inciting event still remains unclear with negative orthostatics, no evidence of PE.  --Echo and carotid duplex largely unremarkable.  --Cardiology following. Have d/w them. For ischemic eval today with further plan to follow.  --Unclear why she was so hypoxic upon arrival, but her oxygenation is much better. CXR is largely unremarkable as was her CT chest from OSH. Echo pending. ICS, needs to ambulate.     Bradycardia, resolved.  New bifascicular block on EKG  CAD s/p CABG/stenting, HTN, HLD  --EKG at OSH sinus rhythm with first degree AV block, right bundle branch block and left anterior fascicular block which has since resolved.  --Cardiology follows, plan as outlined above.  --Hold BB indefinitely.  --Monitor on telemetry      UMAIR/CKD  --Back to baseline. Better after IV fluids, urine sodium indicates prerenal cause though no hx to suggest this. Unclear baseline but knows she has underlying CKD due to DM2.  --Avoid nephrotoxins.  --Monitor.     UTI  --UA at OSH +3 bacteria, u/a  here abx modified.   --IV rocephin x 5 days.     RUE edema  --From infiltration of IV contrast at OSH. Elevate, warm compress, pain control.       T2DM  --SSI      Hypothyroidism  --Continue synthroid     DVT prophylaxis:  SQH     Disposition: I expect the patient to be discharged once above evaluation complete.      CODE STATUS:   Code Status and Medical Interventions:   Ordered at: 08/03/19 0133     Level Of Support Discussed With:    Patient     Code Status:    CPR     Medical Interventions (Level of Support Prior to Arrest):    Full         Electronically signed by Gi Gray II, DO, 08/05/19, 9:30 AM.

## 2019-08-05 NOTE — PLAN OF CARE
Problem: Patient Care Overview  Goal: Plan of Care Review   08/05/19 4000   OTHER   Outcome Summary Pt rested well during shift, no complaints of pain, pt ambulates well in room, VSS, remains on 2L, NPO since midnight for stress test 8/5

## 2019-08-06 VITALS
WEIGHT: 203 LBS | BODY MASS INDEX: 37.36 KG/M2 | OXYGEN SATURATION: 96 % | SYSTOLIC BLOOD PRESSURE: 133 MMHG | TEMPERATURE: 98.2 F | DIASTOLIC BLOOD PRESSURE: 54 MMHG | HEIGHT: 62 IN | RESPIRATION RATE: 18 BRPM | HEART RATE: 66 BPM

## 2019-08-06 LAB
GLUCOSE BLDC GLUCOMTR-MCNC: 238 MG/DL (ref 70–130)
GLUCOSE BLDC GLUCOMTR-MCNC: 280 MG/DL (ref 70–130)

## 2019-08-06 PROCEDURE — 82962 GLUCOSE BLOOD TEST: CPT

## 2019-08-06 PROCEDURE — 63710000001 INSULIN DETEMIR PER 5 UNITS: Performed by: INTERNAL MEDICINE

## 2019-08-06 PROCEDURE — 25010000002 CEFTRIAXONE PER 250 MG: Performed by: INTERNAL MEDICINE

## 2019-08-06 PROCEDURE — 99239 HOSP IP/OBS DSCHRG MGMT >30: CPT | Performed by: INTERNAL MEDICINE

## 2019-08-06 PROCEDURE — 99232 SBSQ HOSP IP/OBS MODERATE 35: CPT | Performed by: INTERNAL MEDICINE

## 2019-08-06 PROCEDURE — G0108 DIAB MANAGE TRN  PER INDIV: HCPCS | Performed by: REGISTERED NURSE

## 2019-08-06 PROCEDURE — 25010000002 HEPARIN (PORCINE) PER 1000 UNITS: Performed by: INTERNAL MEDICINE

## 2019-08-06 PROCEDURE — 63710000001 INSULIN LISPRO (HUMAN) PER 5 UNITS: Performed by: INTERNAL MEDICINE

## 2019-08-06 RX ORDER — CEFUROXIME AXETIL 250 MG/1
250 TABLET ORAL 2 TIMES DAILY
Qty: 4 TABLET | Refills: 0 | Status: SHIPPED | OUTPATIENT
Start: 2019-08-06 | End: 2019-08-08

## 2019-08-06 RX ADMIN — INSULIN LISPRO 5 UNITS: 100 INJECTION, SOLUTION INTRAVENOUS; SUBCUTANEOUS at 08:38

## 2019-08-06 RX ADMIN — INSULIN LISPRO 3 UNITS: 100 INJECTION, SOLUTION INTRAVENOUS; SUBCUTANEOUS at 08:38

## 2019-08-06 RX ADMIN — FLUTICASONE PROPIONATE 2 SPRAY: 50 SPRAY, METERED NASAL at 08:40

## 2019-08-06 RX ADMIN — LEVOTHYROXINE SODIUM 100 MCG: 100 TABLET ORAL at 05:21

## 2019-08-06 RX ADMIN — CHOLECALCIFEROL CAP 125 MCG (5000 UNIT) 5000 UNITS: 125 CAP at 08:37

## 2019-08-06 RX ADMIN — ASPIRIN 325 MG ORAL TABLET 325 MG: 325 PILL ORAL at 08:37

## 2019-08-06 RX ADMIN — INSULIN DETEMIR 20 UNITS: 100 INJECTION, SOLUTION SUBCUTANEOUS at 08:40

## 2019-08-06 RX ADMIN — INSULIN LISPRO 4 UNITS: 100 INJECTION, SOLUTION INTRAVENOUS; SUBCUTANEOUS at 12:25

## 2019-08-06 RX ADMIN — CEFTRIAXONE SODIUM 1 G: 1 INJECTION, POWDER, FOR SOLUTION INTRAMUSCULAR; INTRAVENOUS at 08:37

## 2019-08-06 RX ADMIN — INSULIN LISPRO 5 UNITS: 100 INJECTION, SOLUTION INTRAVENOUS; SUBCUTANEOUS at 12:25

## 2019-08-06 RX ADMIN — CHOLESTYRAMINE 4 G: 4 POWDER, FOR SUSPENSION ORAL at 12:31

## 2019-08-06 RX ADMIN — SODIUM CHLORIDE, PRESERVATIVE FREE 10 ML: 5 INJECTION INTRAVENOUS at 08:37

## 2019-08-06 RX ADMIN — HEPARIN SODIUM 5000 UNITS: 5000 INJECTION INTRAVENOUS; SUBCUTANEOUS at 05:21

## 2019-08-06 RX ADMIN — CITALOPRAM HYDROBROMIDE 20 MG: 20 TABLET ORAL at 08:37

## 2019-08-06 NOTE — CONSULTS
"Diabetes Education  Assessment/Teaching    Patient Name:  Sandrine Verma  YOB: 1950  MRN: [id number]  Admit Date:  8/2/2019      Assessment Date:  8/6/2019    Most Recent Value   General Information    Referral From:  A1c   Height  157.5 cm (62.01\")   Height Method  Stated   Weight  92.1 kg (203 lb)   Weight Method  Bed scale   Have you had weight changes?  No   Are you currently involved in an activity/exercise program?   No   How would you rate your current health?  fair   Is patient pregnant?  n/a   Pregnancy Assessment   Diabetes History   What type of diabetes do you have?  Type 2   Length of Diabetes Diagnosis  10 + years   Current DM knowledge  fair   Do you test your blood sugar at home?  yes   Frequency of checks  twice weekly according to patient   Meter type  cannot recall   Who performs the test?  self   Typical readings  \"sometimes around 200\"   Have you had high blood sugar? (>140mg/dl)  yes   How often do you have high blood sugar?  frequently   How would you rate your diabetes control?  fair   How often do you check your feet?  weekly   Have You Felt Down, Depressed or Hopeless?  no   Have You Felt Little Interest or Pleasure in Doing Things?  no   Education Preferences   What areas of diabetes would you like to learn about?  avoiding high blood sugar, avoiding low blood sugar, diabetes complications, diet information   Nutrition Information   Are you currently following a special meal plan?  frequently   Do you eat mostly at home or out of the house?  at home   Assessment Topics   DM Goals            Most Recent Value   DM Education Needs   Meter  Has own   Meter Type  -- [cannot recall brand]   Frequency of Testing  2 times a week   Medication  Insulin, Oral, Other injectables   Problem Solving  Hypoglycemia, Hyperglycemia, Sick days, Signs, Symptoms, Treatment   Reducing Risks  A1C testing, Blood pressure, Eye exam, Dental exam, Foot care   Healthy Coping  Appropriate   Discharge " "Plan  Home   Motivation  Moderate   Teaching Method  Explanation, Handouts, Discussion   Patient Response  Verbalized understanding            Patient was seen for managment  of type 2 diabetes. Discussed and reviewed with patient about type 2 diabetes self-management, risk factors, and importance of blood glucose control to reduce complications. Target blood glucose readings and A1c goals per ADA were reviewed. Reviewed with patient current A1c of 8.8% and discussed its significance. Signs, symptoms and treatment of hyperglycemia and hypoglycemia were discussed. Lifestyle changes such as physical activity with MD approval and healthy eating were encouraged. She shares that her weakness in control is her nutritional choices, especially with a garden with fresh vegetables and fruits. Discussed briefly portion control versus \"giving up good things\" Referred her to her local health department for help with cooking healthy and pamphlet given with that contact info as patient is packing to be discharged momentarily. She was also given Carb counting and meal planning booklet by [address] that has many references to fresh fruits and vegetables. She has a meter at home and is independent in its use. She denies difficulty obtaining and taking her medication. She denies any low blood sugar or hypoglycemia symptoms, did review signs and symptoms and treatment if needed.  Patient was encouraged to keep record of blood glucose readings to take to follow up appointment with PCP.  OP education was also encouraged for additional education once discharged.  Thank you for this referral.         Electronically signed by:  Carola Schmitz RN  08/06/19 10:25 AM  "

## 2019-08-06 NOTE — PROGRESS NOTES
Cardiology Note:    Stress test reviewed and did not demonstrate ischemia or infarct.      Patient has had no more episodes of bradycardia since stopping metoprolol.    Can be discharged home with 30 day outpatient telemetry monitor to assess for symptomatic bradycardia tomorrow if up, ambulating, and otherwise feeling okay.    ADEBAYO Ramos MD [facility], [facility]  Interventional and General Cardiology

## 2019-08-06 NOTE — PLAN OF CARE
Problem: Patient Care Overview  Goal: Plan of Care Review   08/06/19 0026   Coping/Psychosocial   Plan of Care Reviewed With patient   Plan of Care Review   Progress improving       Problem: Syncope (Adult)  Goal: Physical Safety/Health Maintenance   08/06/19 0026   Syncope (Adult)   Physical Safety/Health Maintenance making progress toward outcome

## 2019-08-06 NOTE — PROGRESS NOTES
Case Management Discharge Note    Final Note: Pt. to dc to home with family. No DME or home care needs identified at this time. Family to transport.     Destination      No service has been selected for the patient.      Durable Medical Equipment      No service has been selected for the patient.      Dialysis/Infusion      No service has been selected for the patient.      Home Medical Care      No service has been selected for the patient.      Therapy      No service has been selected for the patient.      Community Resources      No service has been selected for the patient.             Final Discharge Disposition Code: 01 - home or self-care

## 2019-08-06 NOTE — PROGRESS NOTES
[facility]  Cardiology Progress Note      Chief Complaint/Reason for service:    · Syncope         Patient has felt well since being in the hospital and off of metoprolol therapy.  No recurrent syncopal spells.  Nuclear stress test performed yesterday does not suggest ischemia or infarct.    Past medical, surgical, social and family history reviewed in the patient's electronic medical record.         Vital Sign Min/Max for last 24 hours  Temp  Min: 98.1 °F (36.7 °C)  Max: 98.5 °F (36.9 °C)   BP  Min: 128/59  Max: 180/79   Pulse  Min: 74  Max: 82   Resp  Min: 18  Max: 18   SpO2  Min: 92 %  Max: 94 %   Flow (L/min)  Min: 2  Max: 2      Intake/Output Summary (Last 24 hours) at 8/6/2019 0859  Last data filed at 8/5/2019 1700  Gross per 24 hour   Intake 480 ml   Output --   Net 480 ml           Physical Exam   Constitutional: She is oriented to person, place, and time. She appears well-developed and well-nourished.   HENT:   Head: Normocephalic and atraumatic.   Cardiovascular: Normal rate and regular rhythm.   No murmur heard.  Pulmonary/Chest: Effort normal.   Neurological: She is alert and oriented to person, place, and time.   Skin: Skin is warm and dry.   Psychiatric: She has a normal mood and affect.       Tele: Sinus    Results Review (reviewed the patient's recent labs in the electronic medical record):         Results from last 7 days   Lab Units 08/05/19  0437 08/04/19  0439 08/03/19  0754 08/03/19  0111   SODIUM mmol/L 139 137 133* 137   POTASSIUM mmol/L 4.9 5.0 5.2 5.4*   CHLORIDE mmol/L 106 107 100 99   BUN mg/dL 23 36* 37* 38*   CREATININE mg/dL 1.19* 1.57* 1.76* 1.62*     Results from last 7 days   Lab Units 08/03/19  0111   TROPONIN T ng/mL <0.010     Results from last 7 days   Lab Units 08/04/19  0439 08/03/19  0111   WBC 10*3/mm3 9.48 10.69   HEMOGLOBIN g/dL 12.1 13.5   HEMATOCRIT % 43.4 43.6   PLATELETS 10*3/mm3 201 241       Lab Results   Component Value Date     HGBA1C 8.80 (H) 08/03/2019       Lab Results   Component Value Date    CHOL 151 08/03/2019    TRIG 250 (H) 08/03/2019    HDL 44 08/03/2019    LDL 57 08/03/2019    AST 17 08/03/2019    ALT 13 08/03/2019                Active Hospital Problems    Diagnosis POA   • **Syncope [R55] Yes     Priority: High     · Nonexertional syncope, 8/2/2019  · EKG (8/2/2019): marked sinus bradycardia 39 bpm and bifascicular block (on metoprolol 12.5 mg bid)  · Echo (8/4/2019): LVEF 65%.  No significant valve abnormality.     • Acute respiratory failure with hypoxia and hypercapnia (CMS/HCC) [J96.01, J96.02] Yes     Priority: High     · Normal VQ scan, 8/3/2019  · Echo (8/4/2019): Normal LVEF.  No significant valve abnormality.     • Coronary artery disease involving native coronary artery of native heart with angina pectoris (CMS/HCC) [I25.119] Yes     Priority: Medium     · CABG at [facility] 2007  · Multiple PCI's last 2009 with Dr. Woodward  · Functional class III heart failure symptoms, 8/2019     • Type 2 diabetes mellitus, uncontrolled [E11.29, Z79.4] Not Applicable     Priority: Medium   • Renal insufficiency [N28.9] Yes     Priority: Medium     · Unknown baseline Cr     • Bifascicular bundle branch block [I45.2] Yes     Priority: Medium   • Bacteriuria [R82.71] Yes   • Essential hypertension [I10] Yes   • Hyperlipidemia LDL goal <70 [E78.5] Yes     · High intensity statin therapy indicated     • Hypothyroidism [E03.9] Yes     69-year-old with syncopal episode and noted sinus bradycardia on outside EKG.  Patient has had no further syncopal spells or bradycardic events since being off of metoprolol.         · Discharge home today with 30-day mobile cardiac outpatient telemetry monitor  · Discontinue metoprolol and avoid AV ignacia blocking agents in the future due to bifascicular block  · Follow-up with me in [facility] in 6-week    Elliot Ramos IV, MD  8/6/2019

## 2019-08-06 NOTE — PLAN OF CARE
Problem: ARDS (Acute Resp Distress Syndrome) (Adult)  Goal: Signs and Symptoms of Listed Potential Problems Will be Absent, Minimized or Managed (ARDS)   08/06/19 0026   Goal/Outcome Evaluation   Problems Assessed (Acute Respiratory Distress Syndrome) situational response   Problems Present (ARDS) situational response

## 2019-08-06 NOTE — DISCHARGE SUMMARY
[facility] Medicine Services  DISCHARGE SUMMARY    Patient Name: Sandrine Verma  : 1950  MRN: [id number]    Date of Admission: 2019  Date of Discharge: 2019  Primary Care Physician: Chelsey Bhagat MD    Consults     Date and Time Order Name Status Description    8/3/2019 0133 Inpatient Cardiology Consult Completed           Hospital Course     Presenting Problem:   Acute respiratory failure with hypoxia and hypercapnia (CMS/HCC) [J96.01, J96.02]    Active Hospital Problems    Diagnosis  POA   • **Syncope [R55]  Yes   • Acute respiratory failure with hypoxia and hypercapnia (CMS/HCC) [J96.01, J96.02]  Yes   • Bacteriuria [R82.71]  Yes   • Coronary artery disease involving native coronary artery of native heart with angina pectoris (CMS/HCC) [I25.119]  Yes   • Essential hypertension [I10]  Yes   • Hyperlipidemia LDL goal <70 [E78.5]  Yes   • Type 2 diabetes mellitus, uncontrolled [E11.29, Z79.4]  Not Applicable   • Hypothyroidism [E03.9]  Yes   • Renal insufficiency [N28.9]  Yes   • Bifascicular bundle branch block [I45.2]  Yes      Resolved Hospital Problems    Diagnosis Date Resolved POA   • Hypoxia [R09.02] 2019 Unknown   • Hyperkalemia [E87.5] 2019 Yes   • Hyponatremia [E87.1] 2019 Yes          Hospital Course:  Sandrine Verma is a 69 y.o. female transferred from OSH with respiratory failure and syncope presumably due to bradycardia.    Acute/chronic hypoxic and hypercarbic respiratory failure caused by syncope caused by bradycardia and bifascicular block: 70 y/o female admitted with above. Patient required bipap for resolution of her respiratory failure which was likely caused by her syncope. She underwent extensive evaluation including CXR, CT chest, v/q scan all of which were negative for pulmonary cause of her symptoms. She was seen by Cardiology who performed ischemic evaluation with nuclear stress test which showed no evidence of ischemia. Her  episode was thought to be due to metoprolol and once that was held, her EKG normalized and she had no further episodes on telemetry monitoring. She will wear holter monitor at d/c and f/u with Dr. Ramos in 6 weeks.    UMAIR/CKD: She was noted to have a creatinine of 1.7 upon arrival which did improve to baseline with iv fluids and holding nephrotoxins. She can f/u with her PCP in 1 week w/ BMP at that time.    UTI: Completed 3 days IV rocephin in hospital, will continue PO ceftin x 2 days at discharge.     Discharge Follow Up Recommendations for labs/diagnostics:   PCP in 1 week, Recommend BMP   Dr. Ramos 6 weeks    Day of Discharge     HPI: Up in chair. Feels back to normal. Wants to go home.    Review of Systems  Gen- No fevers, chills  CV- No chest pain, palpitations  Resp- No cough, dyspnea  GI- No N/V/D, abd pain    Otherwise ROS is negative except as mentioned in the HPI.    Vital Signs:   Temp:  [98.1 °F (36.7 °C)-98.5 °F (36.9 °C)] 98.2 °F (36.8 °C)  Heart Rate:  [70-82] 70  Resp:  [18] 18  BP: (128-180)/(48-79) 133/54     Physical Exam:  Constitutional: No acute distress, awake, alert  HENT: NCAT, mucous membranes moist  Respiratory: Clear to auscultation bilaterally, respiratory effort normal   Cardiovascular: RRR, no murmurs, rubs, or gallops, palpable pedal pulses bilaterally  Gastrointestinal: Positive bowel sounds, soft, nontender, nondistended  Musculoskeletal: No bilateral ankle edema  Psychiatric: Appropriate affect, cooperative  Neurologic: Oriented x 3, strength symmetric in all extremities, Cranial Nerves grossly intact to confrontation, speech clear  Skin: No rashes    Pertinent  and/or Most Recent Results     Results from last 7 days   Lab Units 08/05/19  0437 08/04/19  0439 08/03/19  0754 08/03/19  0111   WBC 10*3/mm3  --  9.48  --  10.69   HEMOGLOBIN g/dL  --  12.1  --  13.5   HEMATOCRIT %  --  43.4  --  43.6   PLATELETS 10*3/mm3  --  201  --  241   SODIUM mmol/L 139 137 133* 137    POTASSIUM mmol/L 4.9 5.0 5.2 5.4*   CHLORIDE mmol/L 106 107 100 99   CO2 mmol/L 25.0 20.0* 20.0* 24.0   BUN mg/dL 23 36* 37* 38*   CREATININE mg/dL 1.19* 1.57* 1.76* 1.62*   GLUCOSE mg/dL 261* 331* 275* 236*   CALCIUM mg/dL 9.0 8.8 9.0 9.5     Results from last 7 days   Lab Units 08/03/19  0111   BILIRUBIN mg/dL 0.4   ALK PHOS U/L 52   ALT (SGPT) U/L 13   AST (SGOT) U/L 17     Results from last 7 days   Lab Units 08/03/19  0111   CHOLESTEROL mg/dL 151   TRIGLYCERIDES mg/dL 250*   HDL CHOL mg/dL 44     Results from last 7 days   Lab Units 08/03/19  0116 08/03/19  0111   TSH mIU/mL  --  2.440   HEMOGLOBIN A1C % 8.80*  --    PROBNP pg/mL  --  512.4   TROPONIN T ng/mL  --  <0.010   LACTATE mmol/L  --  1.2       Brief Urine Lab Results  (Last result in the past 365 days)      Color   Clarity   Blood   Leuk Est   Nitrite   Protein   CREAT   Urine HCG        08/03/19 1643             104.8             Microbiology Results Abnormal     Procedure Component Value - Date/Time    Urine Culture - Urine, Urine, Clean Catch [[id number]] Collected:  08/03/19 1534    Lab Status:  Final result Specimen:  Urine, Clean Catch Updated:  08/05/19 1139     Urine Culture <25,000 CFU/mL Mixed Juancho Isolated    Narrative:       Specimen contains mixed organisms of questionable pathogenicity which indicates contamination with commensal juancho.  Further identification is unlikely to provide clinically useful information.  Suggest recollection.          Imaging Results (all)     Procedure Component Value Units Date/Time    NM Lung Ventilation Perfusion [[id number]] Collected:  08/03/19 1708     Updated:  08/05/19 1625    Narrative:       EXAMINATION: NM LUNG VENTILATION PERFUSION - 08/03/2019     INDICATION: Chest pain, shortness of breath. Evaluate for pulmonary  embolus.      TECHNIQUE: 13.77 mCi of Xenon-133 was inhaled and wash-in, equilibrium,  and  washout images were obtained in the posterior projection. Perfusion  imaging was obtained  after intravenous administration of 5.47 mCi of  Technetium 99 MAA.     COMPARISON: Chest x-ray 08/03/2019.     FINDINGS: There is enlargement identified of the heart. Diminished  perfusion and ventilation in the left lower lung field. Findings do  correlate to a pleural effusion on the chest x-ray. There is homogeneous  uptake of tracer seen on ventilation imaging throughout the remainder of  the lung fields. There is homogeneous uptake of tracer seen on perfusion  imaging throughout the lung fields.       Impression:       Pleural effusion identified at the left lung base with  enlargement of the heart. Low probability for pulmonary embolism.      DICTATED:   08/03/2019  EDITED/ls :   08/03/2019      This report was finalized on 8/5/2019 4:22 PM by Dr. Desiree Elias MD.       XR Chest 1 View [[id number]] Collected:  08/03/19 0120     Updated:  08/03/19 0122    Narrative:       CHEST X-RAY, 8/3/2019      HISTORY:    69-year-old female hospital inpatient with noted hypoxia.      TECHNIQUE:  AP portable chest x-ray.      FINDINGS:  Postop changes CABG surgery. The heart is mildly enlarged, but pulmonary vascularity is within normal limits.    Lung volumes are low, but the lungs appear clear. No visible pulmonary infiltrate, focal airspace consolidation or pleural effusion.      Impression:       1. No active disease.  2. Mild cardiomegaly. CABG.    Signer Name: Alexander Erazo MD   Signed: 8/3/2019 1:20 AM   Workstation Name: [id number]    [facility]          Results for orders placed during the hospital encounter of 08/02/19   Duplex Carotid Ultrasound CAR    Narrative · Right internal carotid artery stenosis of 0-49%.  · Left internal carotid artery stenosis of 0-49%.          Results for orders placed during the hospital encounter of 08/02/19   Duplex Carotid Ultrasound CAR    Narrative · Right internal carotid artery stenosis of 0-49%.  · Left internal carotid artery stenosis  of 0-49%.          Results for orders placed during the hospital encounter of 08/02/19   Adult Transthoracic Echo Complete W/ Cont if Necessary Per Protocol    Narrative · Left ventricular systolic function is normal. Estimated EF = 65%.  · Left ventricular wall thickness is consistent with mild concentric   hypertrophy.  · Left atrial cavity size is dilated.  · The cardiac valves are anatomically and functionally normal.            Discharge Details        Discharge Medications      New Medications      Instructions Start Date   aspirin 81 MG tablet   81 mg, Oral, Daily   Start Date:  8/7/2019     cefuroxime 250 MG tablet  Commonly known as:  CEFTIN   250 mg, Oral, 2 Times Daily         Continue These Medications      Instructions Start Date   atorvastatin 20 MG tablet  Commonly known as:  LIPITOR   20 mg, Oral, Nightly      citalopram 20 MG tablet  Commonly known as:  CeleXA   20 mg, Oral, Daily      fluticasone 50 MCG/ACT nasal spray  Commonly known as:  FLONASE   2 sprays, Nasal, Daily      HUMALOG KWIKPEN 100 UNIT/ML solution pen-injector  Generic drug:  Insulin Lispro   10 Units, Subcutaneous, 3 Times Daily Before Meals, 10 units before breakfast and lunch, 20 units before dinner       INVOKANA 300 MG tablet  Generic drug:  Canagliflozin   300 mg, Oral, Daily      levothyroxine 100 MCG tablet  Commonly known as:  SYNTHROID, LEVOTHROID   100 mcg, Oral, Every Morning      OZEMPIC 1 MG/DOSE solution pen-injector  Generic drug:  Semaglutide   1 mg, Subcutaneous, Weekly      TRESIBA FLEXTOUCH 200 UNIT/ML solution pen-injector  Generic drug:  Insulin Degludec   90 Units, Subcutaneous, Daily      vitamin D3 5000 units capsule capsule   5,000 Units, Oral, Daily      WELCHOL 625 MG tablet  Generic drug:  colesevelam   1,250 mg, Oral, 2 Times Daily With Meals         Stop These Medications    metoprolol tartrate 25 MG tablet  Commonly known as:  LOPRESSOR     quinapril 5 MG tablet  Commonly known as:  ACCUPRIL             Allergies   Allergen Reactions   • Levaquin [Levofloxacin] Hallucinations         Discharge Disposition:  Home or Self Care    Discharge Diet:  Diet Order   Procedures   • Diet Regular; Consistent Carbohydrate, Cardiac         Discharge Activity: As directed        CODE STATUS:    Code Status and Medical Interventions:   Ordered at: 08/03/19 0133     Level Of Support Discussed With:    Patient     Code Status:    CPR     Medical Interventions (Level of Support Prior to Arrest):    Full         Future Appointments   Date Time Provider Department Center   9/25/2019 11:00 AM Rachel Sarmiento APRN [facility] None       Additional Instructions for the Follow-ups that You Need to Schedule     Discharge Follow-up with PCP   As directed       Currently Documented PCP:    Chelsey Bhagat MD    PCP Phone Number:    [phone]     Follow Up Details:  1 week hospital follow up         Discharge Follow-up with Specialty: Inga Sarmiento in [facility]; 6 Weeks   As directed      Specialty:  Inga Sarmiento in [facility]    Follow Up:  6 Weeks    Follow Up Details:  Hospital FU for syncope and discuss MCOT results               Time Spent on Discharge: 37 minutes    Electronically signed by Gi Gray II, DO, 08/06/19, 11:03 AM.

## 2019-08-07 ENCOUNTER — READMISSION MANAGEMENT (OUTPATIENT)
Dept: CALL CENTER | Facility: HOSPITAL | Age: 69
End: 2019-08-07

## 2019-08-07 DIAGNOSIS — R55 SYNCOPE AND COLLAPSE: Primary | ICD-10-CM

## 2019-08-07 NOTE — OUTREACH NOTE
Prep Survey      Responses   Facility patient discharged from?  [facility]   Is patient eligible?  Yes   Discharge diagnosis  Acute respiratory failure,  UTI,  Acute Kidney Injury   Does the patient have one of the following disease processes/diagnoses(primary or secondary)?  Other   Does the patient have Home health ordered?  No   Is there a DME ordered?  No   Prep survey completed?  Yes          Silvia Braden RN

## 2019-08-08 ENCOUNTER — READMISSION MANAGEMENT (OUTPATIENT)
Dept: CALL CENTER | Facility: HOSPITAL | Age: 69
End: 2019-08-08

## 2019-08-08 NOTE — OUTREACH NOTE
Medical Week 1 Survey      Responses   Facility patient discharged from?  [place]   Does the patient have one of the following disease processes/diagnoses(primary or secondary)?  Other   Is there a successful TCM telephone encounter documented?  No   Week 1 attempt successful?  Yes   Call start time  0941   Call end time  0948   Discharge diagnosis  Acute respiratory failure,  UTI,  Acute Kidney Injury   Meds reviewed with patient/caregiver?  Yes   Is the patient having any side effects they believe may be caused by any medication additions or changes?  No   Does the patient have all medications ordered at discharge?  Yes   Is the patient taking all medications as directed (includes completed medication regime)?  Yes   Does the patient have a primary care provider?   Yes   Does the patient have an appointment with their PCP within 7 days of discharge?  Yes   Comments regarding PCP  will see PCP tomorrow   Has the patient kept scheduled appointments due by today?  N/A   Has home health visited the patient within 72 hours of discharge?  N/A   Psychosocial issues?  No   Did the patient receive a copy of their discharge instructions?  Yes   Nursing interventions  Reviewed instructions with patient   Is the patient/caregiver able to teach back signs and symptoms related to disease process for when to call PCP?  Yes   Is the patient/caregiver able to teach back signs and symptoms related to disease process for when to call 911?  Yes   Is the patient/caregiver able to teach back the hierarchy of who to call/visit for symptoms/problems? PCP, Specialist, Home health nurse, Urgent Care, ED, 911  Yes   Additional teach back comments  Patient says she is feeling good, no questions or concerns at this time.   Week 1 call completed?  Yes          Silvia Braden RN

## 2019-08-15 ENCOUNTER — READMISSION MANAGEMENT (OUTPATIENT)
Dept: CALL CENTER | Facility: HOSPITAL | Age: 69
End: 2019-08-15

## 2019-08-15 NOTE — OUTREACH NOTE
Medical Week 2 Survey      Responses   Facility patient discharged from?  [place]   Does the patient have one of the following disease processes/diagnoses(primary or secondary)?  Other   Week 2 attempt successful?  No   Unsuccessful attempts  Attempt 1          Shilpa Vasquez RN

## 2019-08-16 ENCOUNTER — READMISSION MANAGEMENT (OUTPATIENT)
Dept: CALL CENTER | Facility: HOSPITAL | Age: 69
End: 2019-08-16

## 2019-08-16 NOTE — OUTREACH NOTE
Medical Week 2 Survey      Responses   Facility patient discharged from?  [place]   Does the patient have one of the following disease processes/diagnoses(primary or secondary)?  Other   Week 2 attempt successful?  Yes   Call start time  1427   Discharge diagnosis  Acute respiratory failure,  UTI,  Acute Kidney Injury   Call end time  1430   Meds reviewed with patient/caregiver?  Yes   Is the patient having any side effects they believe may be caused by any medication additions or changes?  No   Does the patient have all medications ordered at discharge?  Yes   Is the patient taking all medications as directed (includes completed medication regime)?  Yes   Does the patient have a primary care provider?   Yes   Does the patient have an appointment with their PCP within 7 days of discharge?  Yes   Has the patient kept scheduled appointments due by today?  Yes   Has home health visited the patient within 72 hours of discharge?  N/A   Psychosocial issues?  No   Did the patient receive a copy of their discharge instructions?  Yes   Nursing interventions  Reviewed instructions with patient   What is the patient's perception of their health status since discharge?  Improving   Is the patient/caregiver able to teach back signs and symptoms related to disease process for when to call PCP?  Yes   Is the patient/caregiver able to teach back signs and symptoms related to disease process for when to call 911?  Yes   Is the patient/caregiver able to teach back the hierarchy of who to call/visit for symptoms/problems? PCP, Specialist, Home health nurse, Urgent Care, ED, 911  Yes   Additional teach back comments  Patient says she is feeling good, no questions or concerns at this time.   Week 2 Call Completed?  Yes   Graduated  Yes   Did the patient feel the follow up calls were helpful during their recovery period?  Yes   Was the number of calls appropriate?  Yes   Graduated/Revoked comments  All goals met.           Dm CAMP  Rodrigo RN

## 2021-02-01 ENCOUNTER — IMMUNIZATION (OUTPATIENT)
Dept: VACCINE CLINIC | Facility: HOSPITAL | Age: 71
End: 2021-02-01

## 2021-02-01 PROCEDURE — 91300 HC SARSCOV02 VAC 30MCG/0.3ML IM: CPT | Performed by: FAMILY MEDICINE

## 2021-02-01 PROCEDURE — 0001A: CPT | Performed by: FAMILY MEDICINE

## 2021-02-22 ENCOUNTER — IMMUNIZATION (OUTPATIENT)
Dept: VACCINE CLINIC | Facility: HOSPITAL | Age: 71
End: 2021-02-22

## 2021-02-22 PROCEDURE — 91300 HC SARSCOV02 VAC 30MCG/0.3ML IM: CPT | Performed by: INTERNAL MEDICINE

## 2021-02-22 PROCEDURE — 0002A: CPT | Performed by: INTERNAL MEDICINE

## 2023-08-16 ENCOUNTER — HOSPITAL ENCOUNTER (OUTPATIENT)
Dept: CARDIOLOGY | Facility: HOSPITAL | Age: 73
Discharge: HOME | End: 2023-08-16
Admitting: SURGERY
Payer: MEDICARE

## 2023-08-16 DIAGNOSIS — Z99.2 ENCOUNTER FOR EXTRACORPOREAL DIALYSIS: ICD-10-CM

## 2023-08-16 PROCEDURE — 93970 EXTREMITY STUDY: CPT

## 2024-02-27 ENCOUNTER — HOSPITAL ENCOUNTER (OUTPATIENT)
Facility: HOSPITAL | Age: 74
Setting detail: HOSPITAL OUTPATIENT SURGERY
Discharge: HOME | End: 2024-02-27
Attending: INTERNAL MEDICINE | Admitting: INTERNAL MEDICINE
Payer: MEDICARE

## 2024-02-27 VITALS
OXYGEN SATURATION: 99 % | HEART RATE: 62 BPM | DIASTOLIC BLOOD PRESSURE: 62 MMHG | SYSTOLIC BLOOD PRESSURE: 141 MMHG | RESPIRATION RATE: 20 BRPM

## 2024-02-27 RX ORDER — LIDOCAINE HYDROCHLORIDE 20 MG/ML
INJECTION, SOLUTION INFILTRATION; PERINEURAL
Status: DISCONTINUED | OUTPATIENT
Start: 2024-02-27 | End: 2024-02-27 | Stop reason: HOSPADM

## 2024-02-27 NOTE — NURSING NOTE
Discharge instructions given to patient and family. Dressing clean, dry, intact. Patient will be discharged to home. Patient stable.

## 2024-02-27 NOTE — Clinical Note
Pressure dressing applied to site. 4X4 with neosporin onintment, covered with large tegaderm dressing.

## 2024-02-27 NOTE — H&P
Chief complaint removal of the tunneled dialysis catheter    Subjective     Patient is a 74 y.o. female presents with end-stage renal disease hypertension coronary artery disease type 2 diabetes mellitus on Monday Wednesday Friday schedule of dialysis came to the hospital after the right upper arm AV fistula has matured and I been asked to remove the right tunneled dialysis catheter patient denies any pain or fever or any discharge from the exit site of the catheter    Review of Systems   Pertinent items are noted in HPI, all other systems reviewed and negative    History  Past Medical History:   Diagnosis Date    Coronary artery disease     Diabetes mellitus     Disease of thyroid gland     Elevated cholesterol     Hypertension     Kidney stone      Past Surgical History:   Procedure Laterality Date    APPENDECTOMY      BACK SURGERY      CHOLECYSTECTOMY      CORONARY ARTERY BYPASS GRAFT      HYSTERECTOMY       No family history on file.  Social History     Tobacco Use    Smoking status: Never    Smokeless tobacco: Never   Substance Use Topics    Alcohol use: No    Drug use: No     Medications Prior to Admission   Medication Sig Dispense Refill Last Dose    aspirin 81 MG tablet Take 1 tablet by mouth Daily. 30 tablet 0 2/26/2024    atorvastatin (LIPITOR) 20 MG tablet Take 1 tablet by mouth Every Night.   2/26/2024    Canagliflozin (INVOKANA) 300 MG tablet Take 1 tablet by mouth Daily.   2/26/2024    Cholecalciferol (VITAMIN D3) 5000 units capsule capsule Take 1 capsule by mouth Daily.   2/26/2024    citalopram (CeleXA) 20 MG tablet Take 1 tablet by mouth Daily.   2/26/2024    colesevelam (WELCHOL) 625 MG tablet Take 2 tablets by mouth 2 (Two) Times a Day With Meals.   2/26/2024    fluticasone (FLONASE) 50 MCG/ACT nasal spray 2 sprays into the nostril(s) as directed by provider Daily.   2/26/2024    Insulin Degludec (TRESIBA FLEXTOUCH) 200 UNIT/ML solution pen-injector Inject 90 Units under the skin into the  appropriate area as directed Daily.   2/26/2024    Insulin Lispro (HUMALOG KWIKPEN) 100 UNIT/ML solution pen-injector Inject 10 Units under the skin into the appropriate area as directed 3 (Three) Times a Day Before Meals. 10 units before breakfast and lunch, 20 units before dinner   2/26/2024    levothyroxine (SYNTHROID, LEVOTHROID) 100 MCG tablet Take 1 tablet by mouth Every Morning.   2/26/2024    Semaglutide (OZEMPIC) 1 MG/DOSE solution pen-injector Inject 1 mg under the skin into the appropriate area as directed 1 (One) Time Per Week.        Allergies:  Levaquin [levofloxacin]    Objective     Vital Signs  Heart Rate:  [63-66] 66  Resp:  [20] 20  BP: (142-162)/(57-67) 144/57    Physical Exam:      General Appearance:  Alert, cooperative, in no acute distress   Head:  Normocephalic, without obvious abnormality, atraumatic   Eyes:  Lids and lashes normal, conjunctivae and sclerae normal, no icterus, no pallor, corneas clear, PERRLA   Ears:  Ears appear intact with no abnormalities noted   Throat:  No oral lesions, no thrush, oral mucosa moist   Neck:  No adenopathy, supple, trachea midline, no thyromegaly, no carotid bruit, no JVD   Back:  No kyphosis present, no scoliosis present, no skin lesions, erythema or scars, no tenderness to percussion, or palpation, range of motion normal   Lungs:  Clear to auscultation,respirations regular, even and unlabored    Heart:  Regular rhythm and normal rate, normal S1 and S2, no murmur, no gallop, no rub, no click   Breast Exam:  Deferred   Abdomen:  Normal bowel sounds, no masses, no organomegaly, soft non-tender, non-distended, no guarding, no rebound tenderness   Genitalia:  Deferred   Extremities:  Moves all extremities well, no edema, no cyanosis, no redness   Pulses:  Pulses palpable and equal bilaterally   Skin:  No bleeding, bruising or rash   Lymph nodes:  No palpable adenopathy   Neurologic:  Cranial nerves 2 - 12 grossly intact, sensation intact, DTR present and  equal bilaterally  Access: right upper arm AV fistula good maturation and thrill and bruit right IJ tunnel catheter with no exit site infection         Results Review:    I reviewed the patient's new clinical results.    Assessment & Plan       -Complication of access  -End-stage renal disease  -Hypertension   -Diabetes mellitus type 2  -Hypothyroidism    Patient has a right upper arm AV fistula which is being used and is matured has good thrill and bruit so I been asked to remove the right tunneled hemodialysis catheter so I obtained informed consent and then plan to go ahead and remove the tunneled dialysis catheter    Plan of care was discussed with the patient, understood verbalized agreed          I discussed the patients findings and my recommendations with patient and nursing staff.     KYLE Chavez MD  02/27/24  13:27 EST

## 2024-02-27 NOTE — DISCHARGE INSTR - ACTIVITY
Take it easy for the next several days.  Keep dialysis cath site clean, dry, and covered. Do not submerge site underwater, no hot tubs, tub baths, or swimming pools for the next several days to one week. Patient is to continue with dialysis appointments as scheduled, which is Wednesday 2/28/2024 and Friday March 1, 2024. Dressing and sutures will be assessed and removed on the Friday March 1 appointment. No changes to home medication schedule. Patient is to continue taking all home medications as currently prescribed.

## (undated) DEVICE — DRSNG SURESITE123 6X8IN

## (undated) DEVICE — TRY LAC BHCOR

## (undated) DEVICE — TOWL STRL OR PREWSH COTN 17X27IN BLU DISP STRL PK/4